# Patient Record
Sex: FEMALE | Race: WHITE | URBAN - METROPOLITAN AREA
[De-identification: names, ages, dates, MRNs, and addresses within clinical notes are randomized per-mention and may not be internally consistent; named-entity substitution may affect disease eponyms.]

---

## 2017-03-15 ENCOUNTER — OUTPATIENT (OUTPATIENT)
Dept: OUTPATIENT SERVICES | Facility: HOSPITAL | Age: 27
LOS: 1 days | End: 2017-03-15

## 2017-03-24 ENCOUNTER — OUTPATIENT (OUTPATIENT)
Dept: OUTPATIENT SERVICES | Facility: HOSPITAL | Age: 27
LOS: 1 days | End: 2017-03-24

## 2017-04-05 ENCOUNTER — OUTPATIENT (OUTPATIENT)
Dept: OUTPATIENT SERVICES | Facility: HOSPITAL | Age: 27
LOS: 1 days | End: 2017-04-05

## 2017-05-30 ENCOUNTER — TRANSCRIPTION ENCOUNTER (OUTPATIENT)
Age: 27
End: 2017-05-30

## 2017-06-11 ENCOUNTER — TRANSCRIPTION ENCOUNTER (OUTPATIENT)
Age: 27
End: 2017-06-11

## 2017-12-26 ENCOUNTER — TRANSCRIPTION ENCOUNTER (OUTPATIENT)
Age: 27
End: 2017-12-26

## 2018-07-01 ENCOUNTER — TRANSCRIPTION ENCOUNTER (OUTPATIENT)
Age: 28
End: 2018-07-01

## 2019-02-05 ENCOUNTER — TRANSCRIPTION ENCOUNTER (OUTPATIENT)
Age: 29
End: 2019-02-05

## 2019-06-17 ENCOUNTER — OUTPATIENT (OUTPATIENT)
Dept: OUTPATIENT SERVICES | Facility: HOSPITAL | Age: 29
LOS: 1 days | End: 2019-06-17

## 2019-06-28 ENCOUNTER — OUTPATIENT (OUTPATIENT)
Dept: OUTPATIENT SERVICES | Facility: HOSPITAL | Age: 29
LOS: 1 days | End: 2019-06-28

## 2019-10-07 DIAGNOSIS — Z87.39 PERSONAL HISTORY OF OTHER DISEASES OF THE MUSCULOSKELETAL SYSTEM AND CONNECTIVE TISSUE: ICD-10-CM

## 2019-10-07 DIAGNOSIS — Z83.3 FAMILY HISTORY OF DIABETES MELLITUS: ICD-10-CM

## 2019-10-11 ENCOUNTER — APPOINTMENT (OUTPATIENT)
Dept: CARDIOLOGY | Facility: CLINIC | Age: 29
End: 2019-10-11
Payer: COMMERCIAL

## 2019-10-11 ENCOUNTER — NON-APPOINTMENT (OUTPATIENT)
Age: 29
End: 2019-10-11

## 2019-10-11 VITALS
DIASTOLIC BLOOD PRESSURE: 72 MMHG | HEIGHT: 67 IN | WEIGHT: 196 LBS | BODY MASS INDEX: 30.76 KG/M2 | OXYGEN SATURATION: 99 % | HEART RATE: 82 BPM | SYSTOLIC BLOOD PRESSURE: 130 MMHG

## 2019-10-11 PROCEDURE — 93000 ELECTROCARDIOGRAM COMPLETE: CPT

## 2019-10-11 PROCEDURE — 99203 OFFICE O/P NEW LOW 30 MIN: CPT

## 2019-10-11 RX ORDER — MULTIVITAMIN
TABLET ORAL DAILY
Refills: 0 | Status: ACTIVE | COMMUNITY

## 2019-10-11 NOTE — PHYSICAL EXAM
[Normal Oral Mucosa] : normal oral mucosa [No Oral Cyanosis] : no oral cyanosis [No Oral Pallor] : no oral pallor [Normal Jugular Venous A Waves Present] : normal jugular venous A waves present [No Jugular Venous Ulrich A Waves] : no jugular venous ulrich A waves [Heart Rate And Rhythm] : heart rate and rhythm were normal [Normal Jugular Venous V Waves Present] : normal jugular venous V waves present [Heart Sounds] : normal S1 and S2 [Murmurs] : no murmurs present [Exaggerated Use Of Accessory Muscles For Inspiration] : no accessory muscle use [Respiration, Rhythm And Depth] : normal respiratory rhythm and effort [Auscultation Breath Sounds / Voice Sounds] : lungs were clear to auscultation bilaterally [Abdomen Soft] : soft [Abdomen Tenderness] : non-tender [Abdomen Mass (___ Cm)] : no abdominal mass palpated [Abnormal Walk] : normal gait [Nail Clubbing] : no clubbing of the fingernails [Gait - Sufficient For Exercise Testing] : the gait was sufficient for exercise testing [Cyanosis, Localized] : no localized cyanosis [Petechial Hemorrhages (___cm)] : no petechial hemorrhages [Skin Color & Pigmentation] : normal skin color and pigmentation [] : no rash [No Venous Stasis] : no venous stasis [No Skin Ulcers] : no skin ulcer [Skin Lesions] : no skin lesions [No Xanthoma] : no  xanthoma was observed [Oriented To Time, Place, And Person] : oriented to person, place, and time [Affect] : the affect was normal [Mood] : the mood was normal [No Anxiety] : not feeling anxious

## 2019-10-12 NOTE — HISTORY OF PRESENT ILLNESS
[FreeTextEntry1] : MARTINE CRUZ  is a 29 year old  F\par with history of psoriasis, chronic idiopathic urticaria, and familial Mediterranean fever\par Referred by her rheumatologist. \par She was diagnosed with FMF approximately 5 years ago. \par At that time was having abdominal pain and fevers. \par She reports having symptoms like this intermittently for years. \par Her father also has FMF and had pericarditis. \par \par She is active, walks, yoga, barre without limitations \par There are symptoms of dizziness. \par Her baseline blood pressure is low \par There is no exertional chest pain, pressure or discomfort. \par There is no significant dyspnea on exertion or orthopnea. \par There are no symptomatic palpitations, or syncope.\par \par There is no prior history of a clinical myocardial infarction, coronary revascularization. \par There is no history of symptomatic congestive heart failure rheumatic heart disease or valvular disease.\par There is no history of symptomatic arrhythmias including atrial fibrillation.

## 2019-10-12 NOTE — ASSESSMENT
[FreeTextEntry1] : FMF \par Echocardiogram to evaluate for pericardial disease rule out cardiomyopathy. \par Will call with results \par Requested outside rheumatology consultation \par

## 2019-10-25 ENCOUNTER — APPOINTMENT (OUTPATIENT)
Dept: CARDIOLOGY | Facility: CLINIC | Age: 29
End: 2019-10-25

## 2019-11-05 ENCOUNTER — APPOINTMENT (OUTPATIENT)
Dept: CARDIOLOGY | Facility: CLINIC | Age: 29
End: 2019-11-05

## 2019-11-13 ENCOUNTER — OUTPATIENT (OUTPATIENT)
Dept: OUTPATIENT SERVICES | Facility: HOSPITAL | Age: 29
LOS: 1 days | End: 2019-11-13

## 2019-11-18 ENCOUNTER — TRANSCRIPTION ENCOUNTER (OUTPATIENT)
Age: 29
End: 2019-11-18

## 2019-11-22 ENCOUNTER — OUTPATIENT (OUTPATIENT)
Dept: OUTPATIENT SERVICES | Facility: HOSPITAL | Age: 29
LOS: 1 days | End: 2019-11-22

## 2020-04-02 ENCOUNTER — TRANSCRIPTION ENCOUNTER (OUTPATIENT)
Age: 30
End: 2020-04-02

## 2020-04-26 ENCOUNTER — MESSAGE (OUTPATIENT)
Age: 30
End: 2020-04-26

## 2020-05-05 ENCOUNTER — APPOINTMENT (OUTPATIENT)
Dept: DISASTER EMERGENCY | Facility: HOSPITAL | Age: 30
End: 2020-05-05

## 2020-05-06 LAB
SARS-COV-2 IGG SERPL IA-ACNC: 4.5 INDEX
SARS-COV-2 IGG SERPL QL IA: POSITIVE

## 2020-05-26 ENCOUNTER — RX CHANGE (OUTPATIENT)
Age: 30
End: 2020-05-26

## 2020-05-26 RX ORDER — ALBUTEROL SULFATE 90 UG/1
108 (90 BASE) INHALANT RESPIRATORY (INHALATION)
Qty: 2 | Refills: 2 | Status: DISCONTINUED | COMMUNITY
Start: 2020-04-20 | End: 2020-05-26

## 2020-05-28 ENCOUNTER — RX CHANGE (OUTPATIENT)
Age: 30
End: 2020-05-28

## 2020-07-16 ENCOUNTER — EMERGENCY (EMERGENCY)
Facility: HOSPITAL | Age: 30
LOS: 1 days | End: 2020-07-16
Admitting: EMERGENCY MEDICINE
Payer: COMMERCIAL

## 2020-07-16 PROCEDURE — 99284 EMERGENCY DEPT VISIT MOD MDM: CPT

## 2020-10-22 ENCOUNTER — TRANSCRIPTION ENCOUNTER (OUTPATIENT)
Age: 30
End: 2020-10-22

## 2020-10-29 ENCOUNTER — TRANSCRIPTION ENCOUNTER (OUTPATIENT)
Age: 30
End: 2020-10-29

## 2020-11-16 ENCOUNTER — APPOINTMENT (OUTPATIENT)
Dept: OBGYN | Facility: CLINIC | Age: 30
End: 2020-11-16
Payer: COMMERCIAL

## 2020-11-16 ENCOUNTER — TRANSCRIPTION ENCOUNTER (OUTPATIENT)
Age: 30
End: 2020-11-16

## 2020-11-16 VITALS
HEIGHT: 67 IN | BODY MASS INDEX: 31.71 KG/M2 | SYSTOLIC BLOOD PRESSURE: 104 MMHG | DIASTOLIC BLOOD PRESSURE: 70 MMHG | WEIGHT: 202 LBS

## 2020-11-16 DIAGNOSIS — U07.1 COVID-19: ICD-10-CM

## 2020-11-16 DIAGNOSIS — Z80.8 FAMILY HISTORY OF MALIGNANT NEOPLASM OF OTHER ORGANS OR SYSTEMS: ICD-10-CM

## 2020-11-16 DIAGNOSIS — Z80.9 FAMILY HISTORY OF MALIGNANT NEOPLASM, UNSPECIFIED: ICD-10-CM

## 2020-11-16 DIAGNOSIS — Z82.61 FAMILY HISTORY OF ARTHRITIS: ICD-10-CM

## 2020-11-16 DIAGNOSIS — N84.0 POLYP OF CORPUS UTERI: ICD-10-CM

## 2020-11-16 PROCEDURE — 99395 PREV VISIT EST AGE 18-39: CPT

## 2020-11-16 PROCEDURE — 99213 OFFICE O/P EST LOW 20 MIN: CPT | Mod: 25

## 2020-11-16 NOTE — HISTORY OF PRESENT ILLNESS
[FreeTextEntry1] : 40yo G0 LMP 10/31 using natural familiy planning for BCM. Her cycles range from 28-40 days. Her previous GYN tested her for PCOS and she sees and endocrinologist for elevated testosterone. Pt has not had any recent travel, known sick contacts or contact with any PUI, of coronavirus sxs.\par

## 2020-11-16 NOTE — DISCUSSION/SUMMARY
[FreeTextEntry1] : 31yo G0 LMP 10/31 here for annual.  She declined BCM at this time therefore instructed to take NV with FA daily \par \par RHM: \par - DVS neg x 3\par - Dentist, PCP, Derm as discussed \par - Offered STI screen today. Pt declined -- GC/CT off pap \par - Encouraged healthy diet, exercise, weight loss \par \par Essence Block MD \par Obstetrician/Gynecologist\par \par

## 2020-11-16 NOTE — COUNSELING
[Nutrition/ Exercise/ Weight Management] : nutrition, exercise, weight management [Vitamins/Supplements] : vitamins/supplements [Sunscreen] : sunscreen [Drugs/Alcohol] : drugs, alcohol [Contraception/ Emergency Contraception/ Safe Sexual Practices] : contraception, emergency contraception, safe sexual practices [STD (testing, results, tx)] : STD (testing, results, tx)

## 2020-11-21 LAB
C TRACH RRNA SPEC QL NAA+PROBE: NOT DETECTED
CYTOLOGY CVX/VAG DOC THIN PREP: NORMAL
HPV HIGH+LOW RISK DNA PNL CVX: NOT DETECTED
N GONORRHOEA RRNA SPEC QL NAA+PROBE: NOT DETECTED
SOURCE TP AMPLIFICATION: NORMAL

## 2020-12-03 ENCOUNTER — TRANSCRIPTION ENCOUNTER (OUTPATIENT)
Age: 30
End: 2020-12-03

## 2020-12-03 ENCOUNTER — OUTPATIENT (OUTPATIENT)
Dept: OUTPATIENT SERVICES | Facility: HOSPITAL | Age: 30
LOS: 1 days | End: 2020-12-03

## 2020-12-03 ENCOUNTER — NON-APPOINTMENT (OUTPATIENT)
Age: 30
End: 2020-12-03

## 2020-12-09 ENCOUNTER — APPOINTMENT (OUTPATIENT)
Dept: ULTRASOUND IMAGING | Facility: CLINIC | Age: 30
End: 2020-12-09
Payer: COMMERCIAL

## 2020-12-09 ENCOUNTER — APPOINTMENT (OUTPATIENT)
Dept: OBGYN | Facility: CLINIC | Age: 30
End: 2020-12-09
Payer: COMMERCIAL

## 2020-12-09 VITALS
HEIGHT: 67 IN | WEIGHT: 207 LBS | DIASTOLIC BLOOD PRESSURE: 70 MMHG | BODY MASS INDEX: 32.49 KG/M2 | SYSTOLIC BLOOD PRESSURE: 108 MMHG

## 2020-12-09 VITALS — TEMPERATURE: 96.3 F

## 2020-12-09 PROCEDURE — 99072 ADDL SUPL MATRL&STAF TM PHE: CPT

## 2020-12-09 PROCEDURE — 76817 TRANSVAGINAL US OBSTETRIC: CPT

## 2020-12-09 PROCEDURE — 99213 OFFICE O/P EST LOW 20 MIN: CPT

## 2020-12-11 ENCOUNTER — TRANSCRIPTION ENCOUNTER (OUTPATIENT)
Age: 30
End: 2020-12-11

## 2021-01-12 ENCOUNTER — APPOINTMENT (OUTPATIENT)
Dept: FAMILY MEDICINE | Facility: CLINIC | Age: 31
End: 2021-01-12
Payer: COMMERCIAL

## 2021-01-12 VITALS
HEIGHT: 67 IN | SYSTOLIC BLOOD PRESSURE: 100 MMHG | RESPIRATION RATE: 14 BRPM | WEIGHT: 207 LBS | HEART RATE: 72 BPM | BODY MASS INDEX: 32.49 KG/M2 | OXYGEN SATURATION: 100 % | TEMPERATURE: 97.8 F | DIASTOLIC BLOOD PRESSURE: 60 MMHG

## 2021-01-12 DIAGNOSIS — Z78.9 OTHER SPECIFIED HEALTH STATUS: ICD-10-CM

## 2021-01-12 DIAGNOSIS — Z87.898 PERSONAL HISTORY OF OTHER SPECIFIED CONDITIONS: ICD-10-CM

## 2021-01-12 PROCEDURE — 90471 IMMUNIZATION ADMIN: CPT

## 2021-01-12 PROCEDURE — 36415 COLL VENOUS BLD VENIPUNCTURE: CPT

## 2021-01-12 PROCEDURE — 99385 PREV VISIT NEW AGE 18-39: CPT | Mod: 25

## 2021-01-12 PROCEDURE — 90686 IIV4 VACC NO PRSV 0.5 ML IM: CPT

## 2021-01-12 PROCEDURE — 99072 ADDL SUPL MATRL&STAF TM PHE: CPT

## 2021-01-12 RX ORDER — LEVOCETIRIZINE DIHYDROCHLORIDE 5 MG/1
5 TABLET, FILM COATED ORAL DAILY
Refills: 0 | Status: DISCONTINUED | COMMUNITY
End: 2021-01-12

## 2021-01-12 RX ORDER — ERGOCALCIFEROL 1.25 MG/1
1.25 MG CAPSULE, LIQUID FILLED ORAL
Qty: 4 | Refills: 0 | Status: DISCONTINUED | COMMUNITY
Start: 2020-09-16 | End: 2021-01-12

## 2021-01-12 RX ORDER — PEN NEEDLE, DIABETIC 32 GX 1/4"
32G X 6 MM NEEDLE, DISPOSABLE MISCELLANEOUS
Qty: 100 | Refills: 0 | Status: DISCONTINUED | COMMUNITY
Start: 2020-10-28 | End: 2021-01-12

## 2021-01-12 RX ORDER — METRONIDAZOLE 500 MG/1
500 TABLET ORAL
Qty: 14 | Refills: 0 | Status: COMPLETED | COMMUNITY
Start: 2020-10-29

## 2021-01-12 RX ORDER — LIRAGLUTIDE 6 MG/ML
18 INJECTION, SOLUTION SUBCUTANEOUS
Qty: 15 | Refills: 0 | Status: DISCONTINUED | COMMUNITY
Start: 2020-10-15 | End: 2021-01-12

## 2021-01-12 RX ORDER — FLUCONAZOLE 150 MG/1
150 TABLET ORAL
Qty: 2 | Refills: 0 | Status: COMPLETED | COMMUNITY
Start: 2020-10-22

## 2021-01-12 RX ORDER — CHOLECALCIFEROL (VITAMIN D3) 50 MCG
2000 CAPSULE ORAL DAILY
Refills: 0 | Status: DISCONTINUED | COMMUNITY
End: 2021-01-12

## 2021-01-12 RX ORDER — TERCONAZOLE 80 MG/1
80 SUPPOSITORY VAGINAL
Qty: 3 | Refills: 0 | Status: COMPLETED | COMMUNITY
Start: 2020-10-22

## 2021-01-12 RX ORDER — APREMILAST 30 MG/1
30 TABLET, FILM COATED ORAL
Refills: 0 | Status: DISCONTINUED | COMMUNITY
End: 2021-01-12

## 2021-01-12 NOTE — ASSESSMENT
[FreeTextEntry1] : New Patient  exam for 30   year old WF with PMH as stated in HPI / active list. \par \par Management : \par \par See HPI and Plan\par \par Labs in office today.   Will advise. \par \par Best wishes offered !\par

## 2021-01-12 NOTE — PHYSICAL EXAM
[No Acute Distress] : no acute distress [Normal Sclera/Conjunctiva] : normal sclera/conjunctiva [PERRL] : pupils equal round and reactive to light [Normal Oropharynx] : the oropharynx was normal [Normal TMs] : both tympanic membranes were normal [No Lymphadenopathy] : no lymphadenopathy [Thyroid Normal, No Nodules] : the thyroid was normal and there were no nodules present [No Respiratory Distress] : no respiratory distress  [No Accessory Muscle Use] : no accessory muscle use [Clear to Auscultation] : lungs were clear to auscultation bilaterally [Normal Rate] : normal rate  [Regular Rhythm] : with a regular rhythm [No Edema] : there was no peripheral edema [Soft] : abdomen soft [Non Tender] : non-tender [No Masses] : no abdominal mass palpated [Normal Supraclavicular Nodes] : no supraclavicular lymphadenopathy [Normal Posterior Cervical Nodes] : no posterior cervical lymphadenopathy [Normal Anterior Cervical Nodes] : no anterior cervical lymphadenopathy [No Spinal Tenderness] : no spinal tenderness [No Joint Swelling] : no joint swelling [Grossly Normal Strength/Tone] : grossly normal strength/tone [No Rash] : no rash [No Focal Deficits] : no focal deficits [Normal Gait] : normal gait [Deep Tendon Reflexes (DTR)] : deep tendon reflexes were 2+ and symmetric [Alert and Oriented x3] : oriented to person, place, and time [Normal Insight/Judgement] : insight and judgment were intact [de-identified] : calm and engaging  [de-identified] : MASK

## 2021-01-12 NOTE — REVIEW OF SYSTEMS
[Fever] : no fever [Fatigue] : fatigue [Night Sweats] : no night sweats [Nasal Discharge] : nasal discharge [Postnasal Drip] : postnasal drip [Itching] : Itching [Negative] : Psychiatric [de-identified] : scalp

## 2021-01-12 NOTE — HISTORY OF PRESENT ILLNESS
[FreeTextEntry1] : new pt here to establish care and annual physical  [de-identified] : Ms. MARTINE CRUZ presents today to establish care being referred to me by staff at [facility]\par She is an affable 30 year old female with PMH significant for Familial Mediterranean  Fever , PCOS, Psoriasis and psoriatic arthritis  ( treated with Otezla in the past) .\par She was COVID + in April;   fully recovered. \par \par IS now 10 weeks pregnant; Due Date is August 7 2020. \par \par PSH significant for as noted \par \par Denies any recent ER visits/hospitalizations/ MVA's or MSK injuries. \par \par Providers:   Rheumatology Dr. Kumari \par                    Endocrinology  Slim Mcfarlane; Dr. Lila Guzman Higinio\par                    OB/GYN  Dr. 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

## 2021-01-15 LAB
ALBUMIN SERPL ELPH-MCNC: 4.6 G/DL
ALP BLD-CCNC: 67 U/L
ALT SERPL-CCNC: 10 U/L
ANION GAP SERPL CALC-SCNC: 16 MMOL/L
AST SERPL-CCNC: 14 U/L
BASOPHILS # BLD AUTO: 0.02 K/UL
BASOPHILS NFR BLD AUTO: 0.1 %
BILIRUB SERPL-MCNC: 0.2 MG/DL
BUN SERPL-MCNC: 8 MG/DL
CALCIUM SERPL-MCNC: 9.8 MG/DL
CHLORIDE SERPL-SCNC: 100 MMOL/L
CHOLEST SERPL-MCNC: 165 MG/DL
CO2 SERPL-SCNC: 21 MMOL/L
CREAT SERPL-MCNC: 0.88 MG/DL
EOSINOPHIL # BLD AUTO: 0.03 K/UL
EOSINOPHIL NFR BLD AUTO: 0.2 %
ESTIMATED AVERAGE GLUCOSE: 97 MG/DL
GLUCOSE SERPL-MCNC: 85 MG/DL
HBA1C MFR BLD HPLC: 5 %
HCT VFR BLD CALC: 41.2 %
HDLC SERPL-MCNC: 67 MG/DL
HGB BLD-MCNC: 12.8 G/DL
IMM GRANULOCYTES NFR BLD AUTO: 0.1 %
LDLC SERPL CALC-MCNC: 85 MG/DL
LYMPHOCYTES # BLD AUTO: 2.85 K/UL
LYMPHOCYTES NFR BLD AUTO: 18.9 %
MAN DIFF?: NORMAL
MCHC RBC-ENTMCNC: 30.4 PG
MCHC RBC-ENTMCNC: 31.1 GM/DL
MCV RBC AUTO: 97.9 FL
MONOCYTES # BLD AUTO: 0.87 K/UL
MONOCYTES NFR BLD AUTO: 5.8 %
NEUTROPHILS # BLD AUTO: 11.26 K/UL
NEUTROPHILS NFR BLD AUTO: 74.9 %
NONHDLC SERPL-MCNC: 98 MG/DL
PLATELET # BLD AUTO: 352 K/UL
POTASSIUM SERPL-SCNC: 4.2 MMOL/L
PROT SERPL-MCNC: 7.5 G/DL
RBC # BLD: 4.21 M/UL
RBC # FLD: 13.2 %
SODIUM SERPL-SCNC: 138 MMOL/L
TRIGL SERPL-MCNC: 67 MG/DL
TSH SERPL-ACNC: 1.3 UIU/ML
WBC # FLD AUTO: 15.05 K/UL

## 2021-01-22 ENCOUNTER — ASOB RESULT (OUTPATIENT)
Age: 31
End: 2021-01-22

## 2021-01-22 ENCOUNTER — APPOINTMENT (OUTPATIENT)
Dept: OBGYN | Facility: CLINIC | Age: 31
End: 2021-01-22
Payer: COMMERCIAL

## 2021-01-22 ENCOUNTER — NON-APPOINTMENT (OUTPATIENT)
Age: 31
End: 2021-01-22

## 2021-01-22 VITALS
HEIGHT: 67 IN | WEIGHT: 209 LBS | SYSTOLIC BLOOD PRESSURE: 116 MMHG | BODY MASS INDEX: 32.8 KG/M2 | DIASTOLIC BLOOD PRESSURE: 82 MMHG

## 2021-01-22 PROCEDURE — 99072 ADDL SUPL MATRL&STAF TM PHE: CPT

## 2021-01-22 PROCEDURE — 76813 OB US NUCHAL MEAS 1 GEST: CPT | Mod: 59

## 2021-01-22 PROCEDURE — 0500F INITIAL PRENATAL CARE VISIT: CPT

## 2021-01-25 ENCOUNTER — NON-APPOINTMENT (OUTPATIENT)
Age: 31
End: 2021-01-25

## 2021-01-26 ENCOUNTER — NON-APPOINTMENT (OUTPATIENT)
Age: 31
End: 2021-01-26

## 2021-01-27 LAB
ABO + RH PNL BLD: NORMAL
ADDENDUM DOC: NORMAL
AFP PNL SERPL: NORMAL
AFP SERPL-ACNC: NORMAL
AR GENE MUT ANL BLD/T: NORMAL
B19V IGG SER QL IA: 6.79 INDEX
B19V IGG+IGM SER-IMP: NORMAL
B19V IGG+IGM SER-IMP: POSITIVE
B19V IGM FLD-ACNC: 0.14 INDEX
B19V IGM SER-ACNC: NEGATIVE
BACTERIA UR CULT: NORMAL
BASOPHILS # BLD AUTO: 0.01 K/UL
BASOPHILS NFR BLD AUTO: 0.1 %
BLD GP AB SCN SERPL QL: NORMAL
BP MEAN: NORMAL
CLINICAL BIOCHEMIST REVIEW: NORMAL
CMV IGG SERPL QL: 8.8 U/ML
CMV IGG SERPL-IMP: POSITIVE
CMV IGM SERPL QL: <8 AU/ML
CMV IGM SERPL QL: NEGATIVE
EARLY ONSET PREECLAMPSIA: NORMAL
EOSINOPHIL # BLD AUTO: 0.04 K/UL
EOSINOPHIL NFR BLD AUTO: 0.3 %
FREE BETA HCG 1ST TRIMESTER: NORMAL
HBV SURFACE AG SER QL: NONREACTIVE
HCT VFR BLD CALC: 39.7 %
HCV AB SER QL: NONREACTIVE
HCV S/CO RATIO: 0.16 S/CO
HGB A MFR BLD: 96.4 %
HGB A2 MFR BLD: 2.8 %
HGB BLD-MCNC: 12.8 G/DL
HGB F MFR BLD: 0.8 %
HGB FRACT BLD-IMP: NORMAL
HIV1+2 AB SPEC QL IA.RAPID: NONREACTIVE
IMM GRANULOCYTES NFR BLD AUTO: 0.4 %
INHIBIN-A 1ST TRIMESTER: NORMAL
LEAD BLD-MCNC: <1 UG/DL
LYMPHOCYTES # BLD AUTO: 3.05 K/UL
LYMPHOCYTES NFR BLD AUTO: 24.7 %
Lab: NORMAL
M TB IFN-G BLD-IMP: NEGATIVE
MAN DIFF?: NORMAL
MCHC RBC-ENTMCNC: 30.8 PG
MCHC RBC-ENTMCNC: 32.2 GM/DL
MCV RBC AUTO: 95.4 FL
MEV IGG FLD QL IA: >300 AU/ML
MEV IGG+IGM SER-IMP: POSITIVE
MEV IGM SER QL: <0.91 ISR
MONOCYTES # BLD AUTO: 0.72 K/UL
MONOCYTES NFR BLD AUTO: 5.8 %
NASAL BONE: PRESENT
NEUTROPHILS # BLD AUTO: 8.46 K/UL
NEUTROPHILS NFR BLD AUTO: 68.7 %
NOTES NTD: NORMAL
NT: NORMAL
PAPP-A SERPL-ACNC: NORMAL
PIGF SER-MCNC: NORMAL
PLATELET # BLD AUTO: 327 K/UL
QUANTIFERON TB PLUS MITOGEN MINUS NIL: 8.78 IU/ML
QUANTIFERON TB PLUS NIL: 0.02 IU/ML
QUANTIFERON TB PLUS TB1 MINUS NIL: -0.01 IU/ML
QUANTIFERON TB PLUS TB2 MINUS NIL: -0.01 IU/ML
RBC # BLD: 4.16 M/UL
RBC # FLD: 12.9 %
RUBV IGG FLD-ACNC: 14.2 INDEX
RUBV IGG SER-IMP: POSITIVE
RUBV IGM FLD-ACNC: <20 AU/ML
T GONDII AB SER-IMP: NEGATIVE
T GONDII AB SER-IMP: NEGATIVE
T GONDII IGG SER QL: <3 IU/ML
T GONDII IGM SER QL: <3 AU/ML
T PALLIDUM AB SER QL IA: NEGATIVE
TRISOMY 18/3: NORMAL
UTERINE ARTERY DOPPLER PI (UTAD-PI): NORMAL
VZV AB TITR SER: POSITIVE
VZV IGG SER IF-ACNC: >4000 INDEX
WBC # FLD AUTO: 12.33 K/UL

## 2021-02-02 ENCOUNTER — APPOINTMENT (OUTPATIENT)
Dept: OBGYN | Facility: CLINIC | Age: 31
End: 2021-02-02

## 2021-02-03 ENCOUNTER — APPOINTMENT (OUTPATIENT)
Dept: OBGYN | Facility: CLINIC | Age: 31
End: 2021-02-03
Payer: COMMERCIAL

## 2021-02-03 PROCEDURE — 0500F INITIAL PRENATAL CARE VISIT: CPT

## 2021-02-14 ENCOUNTER — NON-APPOINTMENT (OUTPATIENT)
Age: 31
End: 2021-02-14

## 2021-02-16 ENCOUNTER — NON-APPOINTMENT (OUTPATIENT)
Age: 31
End: 2021-02-16

## 2021-02-16 ENCOUNTER — TRANSCRIPTION ENCOUNTER (OUTPATIENT)
Age: 31
End: 2021-02-16

## 2021-02-17 ENCOUNTER — OUTPATIENT (OUTPATIENT)
Dept: OUTPATIENT SERVICES | Facility: HOSPITAL | Age: 31
LOS: 1 days | End: 2021-02-17

## 2021-02-19 LAB
CFTR MUT TESTED BLD/T: NEGATIVE
CLARIM 15Q11.2: NORMAL
CLARIM 1P36: NORMAL
CLARIM 22Q11.2: NORMAL
CLARIM 4P-/WOLF-HIRSCHHORN: NORMAL
CLARIM 5P-/CRI DU CHAT: NORMAL
CLARIM ADDITIONAL INFO: NORMAL
CLARIM CHROMOSOME 13: NORMAL
CLARIM CHROMOSOME 18: NORMAL
CLARIM CHROMOSOME 21: NORMAL
CLARIM SEX CHROMOSOMES: NORMAL
CLARITEST NIPT W/MICRO: NORMAL
FMR1 GENE MUT ANL BLD/T: NORMAL

## 2021-02-21 ENCOUNTER — NON-APPOINTMENT (OUTPATIENT)
Age: 31
End: 2021-02-21

## 2021-02-21 NOTE — HISTORY OF PRESENT ILLNESS
[FreeTextEntry1] : 29yo G1 LMP 10/31 with Pos HPT. She reports LLQ pain. No bleeding or d/c. Pt has not had any recent travel, known sick contacts or contact with any PUI, of coronavirus sxs.\par \par \par Her recent pap/HPV/GC/CT. \par \par

## 2021-02-21 NOTE — DISCUSSION/SUMMARY
[FreeTextEntry1] : 31yo G1 @ with pos UCG and LLQ pain. Sent for stat TVUS to r/o ectopic. \par \par - stop RA meds \par - TVUS ASAP\par - PNV with FA\par - Dietary, exercise, travel, intercourse \par - RTO @ 8wks for dating sono and official intake

## 2021-02-21 NOTE — PHYSICAL EXAM
[Soft] : soft [No Lesions] : no lesions [No Mass] : no mass [FreeTextEntry7] : minimally tender to palpation in LLQ  [Labia Majora] : normal [Labia Minora] : normal [Normal] : normal [Adnexa Tenderness On The Left] : tender [FreeTextEntry6] : minimal tenderness to deep palpation.  No R/G/R

## 2021-02-22 ENCOUNTER — NON-APPOINTMENT (OUTPATIENT)
Age: 31
End: 2021-02-22

## 2021-02-22 ENCOUNTER — APPOINTMENT (OUTPATIENT)
Dept: OBGYN | Facility: CLINIC | Age: 31
End: 2021-02-22

## 2021-02-22 ENCOUNTER — APPOINTMENT (OUTPATIENT)
Dept: OBGYN | Facility: CLINIC | Age: 31
End: 2021-02-22
Payer: COMMERCIAL

## 2021-02-22 VITALS
WEIGHT: 214 LBS | SYSTOLIC BLOOD PRESSURE: 112 MMHG | BODY MASS INDEX: 33.59 KG/M2 | HEIGHT: 67 IN | DIASTOLIC BLOOD PRESSURE: 70 MMHG

## 2021-02-22 LAB
BILIRUB UR QL STRIP: NORMAL
GLUCOSE UR-MCNC: NORMAL
HCG UR QL: 0.2 EU/DL
HGB UR QL STRIP.AUTO: NORMAL
KETONES UR-MCNC: NORMAL
LEUKOCYTE ESTERASE UR QL STRIP: NORMAL
NITRITE UR QL STRIP: NORMAL
PH UR STRIP: 6
PROT UR STRIP-MCNC: NORMAL
SP GR UR STRIP: 1.01

## 2021-02-22 PROCEDURE — 0502F SUBSEQUENT PRENATAL CARE: CPT

## 2021-03-02 ENCOUNTER — ASOB RESULT (OUTPATIENT)
Age: 31
End: 2021-03-02

## 2021-03-02 ENCOUNTER — APPOINTMENT (OUTPATIENT)
Dept: MATERNAL FETAL MEDICINE | Facility: CLINIC | Age: 31
End: 2021-03-02
Payer: COMMERCIAL

## 2021-03-02 PROCEDURE — 99202 OFFICE O/P NEW SF 15 MIN: CPT | Mod: 95

## 2021-03-21 ENCOUNTER — NON-APPOINTMENT (OUTPATIENT)
Age: 31
End: 2021-03-21

## 2021-03-21 LAB
1ST TRIMESTER DATA: NORMAL
2ND TRIMESTER DATA: NORMAL
ADDENDUM DOC: NORMAL
AFP PNL SERPL: NORMAL
AFP SERPL-ACNC: NORMAL
AFP SERPL-ACNC: NORMAL
B-HCG FREE SERPL-MCNC: NORMAL
CLINICAL BIOCHEMIST REVIEW: NORMAL
FREE BETA HCG 1ST TRIMESTER: NORMAL
INHIBIN A SERPL-MCNC: NORMAL
INHIBIN-A 1ST TRIMESTER: NORMAL
NASAL BONE: PRESENT
NOTES NTD: NORMAL
NT: NORMAL
PAPP-A SERPL-ACNC: NORMAL
PIGF SER-MCNC: NORMAL
U ESTRIOL SERPL-SCNC: NORMAL

## 2021-03-22 ENCOUNTER — APPOINTMENT (OUTPATIENT)
Dept: OBGYN | Facility: CLINIC | Age: 31
End: 2021-03-22

## 2021-03-24 ENCOUNTER — NON-APPOINTMENT (OUTPATIENT)
Age: 31
End: 2021-03-24

## 2021-03-24 ENCOUNTER — APPOINTMENT (OUTPATIENT)
Dept: ANTEPARTUM | Facility: CLINIC | Age: 31
End: 2021-03-24
Payer: COMMERCIAL

## 2021-03-24 ENCOUNTER — ASOB RESULT (OUTPATIENT)
Age: 31
End: 2021-03-24

## 2021-03-24 PROCEDURE — 76811 OB US DETAILED SNGL FETUS: CPT | Mod: 59

## 2021-03-24 PROCEDURE — 99072 ADDL SUPL MATRL&STAF TM PHE: CPT

## 2021-03-25 ENCOUNTER — NON-APPOINTMENT (OUTPATIENT)
Age: 31
End: 2021-03-25

## 2021-03-25 ENCOUNTER — APPOINTMENT (OUTPATIENT)
Dept: OBGYN | Facility: CLINIC | Age: 31
End: 2021-03-25
Payer: COMMERCIAL

## 2021-03-25 VITALS
HEIGHT: 67 IN | DIASTOLIC BLOOD PRESSURE: 76 MMHG | BODY MASS INDEX: 34.37 KG/M2 | WEIGHT: 219 LBS | SYSTOLIC BLOOD PRESSURE: 120 MMHG

## 2021-03-25 LAB
BILIRUB UR QL STRIP: NORMAL
COLLECTION METHOD: NORMAL
GLUCOSE UR-MCNC: NORMAL
HCG UR QL: 0.2 EU/DL
HGB UR QL STRIP.AUTO: ABNORMAL
KETONES UR-MCNC: NORMAL
LEUKOCYTE ESTERASE UR QL STRIP: NORMAL
NITRITE UR QL STRIP: NORMAL
PH UR STRIP: 7.5
PROT UR STRIP-MCNC: NORMAL
SP GR UR STRIP: 1.02

## 2021-03-25 PROCEDURE — 0502F SUBSEQUENT PRENATAL CARE: CPT

## 2021-04-05 ENCOUNTER — NON-APPOINTMENT (OUTPATIENT)
Age: 31
End: 2021-04-05

## 2021-04-07 ENCOUNTER — APPOINTMENT (OUTPATIENT)
Dept: ANTEPARTUM | Facility: CLINIC | Age: 31
End: 2021-04-07
Payer: COMMERCIAL

## 2021-04-07 ENCOUNTER — ASOB RESULT (OUTPATIENT)
Age: 31
End: 2021-04-07

## 2021-04-07 PROCEDURE — 99072 ADDL SUPL MATRL&STAF TM PHE: CPT

## 2021-04-07 PROCEDURE — 76816 OB US FOLLOW-UP PER FETUS: CPT

## 2021-04-12 ENCOUNTER — NON-APPOINTMENT (OUTPATIENT)
Age: 31
End: 2021-04-12

## 2021-04-19 ENCOUNTER — NON-APPOINTMENT (OUTPATIENT)
Age: 31
End: 2021-04-19

## 2021-04-19 ENCOUNTER — APPOINTMENT (OUTPATIENT)
Dept: OBGYN | Facility: CLINIC | Age: 31
End: 2021-04-19
Payer: COMMERCIAL

## 2021-04-19 ENCOUNTER — APPOINTMENT (OUTPATIENT)
Dept: OBGYN | Facility: CLINIC | Age: 31
End: 2021-04-19

## 2021-04-19 VITALS
WEIGHT: 225 LBS | TEMPERATURE: 97.7 F | DIASTOLIC BLOOD PRESSURE: 68 MMHG | BODY MASS INDEX: 35.31 KG/M2 | HEIGHT: 67 IN | SYSTOLIC BLOOD PRESSURE: 112 MMHG

## 2021-04-19 LAB
BILIRUB UR QL STRIP: NORMAL
GLUCOSE UR-MCNC: NORMAL
HCG UR QL: 0.2 EU/DL
HGB UR QL STRIP.AUTO: NORMAL
KETONES UR-MCNC: NORMAL
LEUKOCYTE ESTERASE UR QL STRIP: NORMAL
NITRITE UR QL STRIP: NORMAL
PH UR STRIP: 7.5
PROT UR STRIP-MCNC: NORMAL
SP GR UR STRIP: 1.2

## 2021-04-19 PROCEDURE — 59025 FETAL NON-STRESS TEST: CPT

## 2021-04-19 PROCEDURE — 59425 ANTEPARTUM CARE ONLY: CPT

## 2021-04-19 PROCEDURE — 99072 ADDL SUPL MATRL&STAF TM PHE: CPT

## 2021-04-19 PROCEDURE — 0502F SUBSEQUENT PRENATAL CARE: CPT

## 2021-04-20 ENCOUNTER — NON-APPOINTMENT (OUTPATIENT)
Age: 31
End: 2021-04-20

## 2021-04-20 LAB
APPEARANCE: CLEAR
BACTERIA: NEGATIVE
BILIRUBIN URINE: NEGATIVE
BLOOD URINE: NEGATIVE
COLOR: YELLOW
GLUCOSE QUALITATIVE U: NEGATIVE
HYALINE CASTS: 0 /LPF
KETONES URINE: NEGATIVE
LEUKOCYTE ESTERASE URINE: NEGATIVE
MICROSCOPIC-UA: NORMAL
NITRITE URINE: NEGATIVE
PH URINE: 7.5
PROTEIN URINE: NORMAL
RED BLOOD CELLS URINE: 1 /HPF
SPECIFIC GRAVITY URINE: 1.02
SQUAMOUS EPITHELIAL CELLS: 1 /HPF
UROBILINOGEN URINE: NORMAL
WHITE BLOOD CELLS URINE: 1 /HPF

## 2021-04-21 LAB
BACTERIA UR CULT: NORMAL
CANDIDA VAG CYTO: NOT DETECTED
G VAGINALIS+PREV SP MTYP VAG QL MICRO: NOT DETECTED
T VAGINALIS VAG QL WET PREP: NOT DETECTED

## 2021-05-06 ENCOUNTER — NON-APPOINTMENT (OUTPATIENT)
Age: 31
End: 2021-05-06

## 2021-05-10 ENCOUNTER — NON-APPOINTMENT (OUTPATIENT)
Age: 31
End: 2021-05-10

## 2021-05-10 ENCOUNTER — APPOINTMENT (OUTPATIENT)
Dept: OBGYN | Facility: CLINIC | Age: 31
End: 2021-05-10
Payer: COMMERCIAL

## 2021-05-10 ENCOUNTER — LABORATORY RESULT (OUTPATIENT)
Age: 31
End: 2021-05-10

## 2021-05-10 VITALS
DIASTOLIC BLOOD PRESSURE: 68 MMHG | BODY MASS INDEX: 35.94 KG/M2 | SYSTOLIC BLOOD PRESSURE: 112 MMHG | HEIGHT: 67 IN | WEIGHT: 229 LBS

## 2021-05-10 PROCEDURE — 0502F SUBSEQUENT PRENATAL CARE: CPT

## 2021-05-12 LAB
BILIRUB UR QL STRIP: NORMAL
GLUCOSE UR-MCNC: NORMAL
HCG UR QL: 0.2 EU/DL
HGB UR QL STRIP.AUTO: NORMAL
KETONES UR-MCNC: ABNORMAL
LEUKOCYTE ESTERASE UR QL STRIP: NORMAL
NITRITE UR QL STRIP: NORMAL
PH UR STRIP: 6.5
PROT UR STRIP-MCNC: NORMAL
SP GR UR STRIP: 1.01

## 2021-06-07 ENCOUNTER — APPOINTMENT (OUTPATIENT)
Dept: OBGYN | Facility: CLINIC | Age: 31
End: 2021-06-07
Payer: COMMERCIAL

## 2021-06-07 ENCOUNTER — NON-APPOINTMENT (OUTPATIENT)
Age: 31
End: 2021-06-07

## 2021-06-07 VITALS
SYSTOLIC BLOOD PRESSURE: 108 MMHG | WEIGHT: 233 LBS | DIASTOLIC BLOOD PRESSURE: 62 MMHG | BODY MASS INDEX: 36.57 KG/M2 | HEIGHT: 67 IN

## 2021-06-07 LAB
BILIRUB UR QL STRIP: NORMAL
GLUCOSE UR-MCNC: NORMAL
HCG UR QL: 0.2 EU/DL
HGB UR QL STRIP.AUTO: NORMAL
KETONES UR-MCNC: NORMAL
LEUKOCYTE ESTERASE UR QL STRIP: NORMAL
NITRITE UR QL STRIP: NORMAL
PH UR STRIP: 8.5
PROT UR STRIP-MCNC: ABNORMAL
SP GR UR STRIP: 1.01

## 2021-06-07 PROCEDURE — 90715 TDAP VACCINE 7 YRS/> IM: CPT

## 2021-06-07 PROCEDURE — 90471 IMMUNIZATION ADMIN: CPT

## 2021-06-07 PROCEDURE — 0502F SUBSEQUENT PRENATAL CARE: CPT

## 2021-06-16 ENCOUNTER — ASOB RESULT (OUTPATIENT)
Age: 31
End: 2021-06-16

## 2021-06-16 ENCOUNTER — APPOINTMENT (OUTPATIENT)
Dept: ANTEPARTUM | Facility: CLINIC | Age: 31
End: 2021-06-16
Payer: COMMERCIAL

## 2021-06-16 PROCEDURE — 99072 ADDL SUPL MATRL&STAF TM PHE: CPT

## 2021-06-16 PROCEDURE — 76819 FETAL BIOPHYS PROFIL W/O NST: CPT

## 2021-06-16 PROCEDURE — 76816 OB US FOLLOW-UP PER FETUS: CPT

## 2021-06-22 ENCOUNTER — NON-APPOINTMENT (OUTPATIENT)
Age: 31
End: 2021-06-22

## 2021-06-22 ENCOUNTER — APPOINTMENT (OUTPATIENT)
Dept: OBGYN | Facility: CLINIC | Age: 31
End: 2021-06-22
Payer: COMMERCIAL

## 2021-06-22 VITALS
SYSTOLIC BLOOD PRESSURE: 120 MMHG | BODY MASS INDEX: 37.28 KG/M2 | TEMPERATURE: 97.5 F | WEIGHT: 238 LBS | DIASTOLIC BLOOD PRESSURE: 68 MMHG

## 2021-06-22 LAB
BILIRUB UR QL STRIP: NORMAL
CLARITY UR: CLEAR
COLLECTION METHOD: NORMAL
GLUCOSE UR-MCNC: NORMAL
HCG UR QL: 0.2 EU/DL
HGB UR QL STRIP.AUTO: NORMAL
KETONES UR-MCNC: NORMAL
LEUKOCYTE ESTERASE UR QL STRIP: NORMAL
NITRITE UR QL STRIP: NORMAL
PH UR STRIP: 5
PROT UR STRIP-MCNC: NORMAL
SP GR UR STRIP: 1.01

## 2021-06-22 PROCEDURE — 0502F SUBSEQUENT PRENATAL CARE: CPT

## 2021-06-26 ENCOUNTER — NON-APPOINTMENT (OUTPATIENT)
Age: 31
End: 2021-06-26

## 2021-06-30 ENCOUNTER — NON-APPOINTMENT (OUTPATIENT)
Age: 31
End: 2021-06-30

## 2021-07-06 ENCOUNTER — NON-APPOINTMENT (OUTPATIENT)
Age: 31
End: 2021-07-06

## 2021-07-06 ENCOUNTER — APPOINTMENT (OUTPATIENT)
Dept: OBGYN | Facility: CLINIC | Age: 31
End: 2021-07-06

## 2021-07-06 ENCOUNTER — APPOINTMENT (OUTPATIENT)
Dept: OBGYN | Facility: CLINIC | Age: 31
End: 2021-07-06
Payer: COMMERCIAL

## 2021-07-06 VITALS
WEIGHT: 238 LBS | SYSTOLIC BLOOD PRESSURE: 112 MMHG | BODY MASS INDEX: 37.35 KG/M2 | TEMPERATURE: 97.6 F | HEIGHT: 67 IN | DIASTOLIC BLOOD PRESSURE: 70 MMHG

## 2021-07-06 PROCEDURE — 0502F SUBSEQUENT PRENATAL CARE: CPT

## 2021-07-06 PROCEDURE — 59025 FETAL NON-STRESS TEST: CPT

## 2021-07-06 PROCEDURE — 36415 COLL VENOUS BLD VENIPUNCTURE: CPT

## 2021-07-07 ENCOUNTER — NON-APPOINTMENT (OUTPATIENT)
Age: 31
End: 2021-07-07

## 2021-07-07 LAB
BILIRUB UR QL STRIP: NORMAL
GLUCOSE UR-MCNC: NORMAL
HCG UR QL: 0.2 EU/DL
HGB UR QL STRIP.AUTO: NORMAL
HIV1+2 AB SPEC QL IA.RAPID: NONREACTIVE
KETONES UR-MCNC: 15
LEUKOCYTE ESTERASE UR QL STRIP: NORMAL
NITRITE UR QL STRIP: NORMAL
PH UR STRIP: 7
PROT UR STRIP-MCNC: NORMAL
SP GR UR STRIP: 1.02

## 2021-07-09 ENCOUNTER — TRANSCRIPTION ENCOUNTER (OUTPATIENT)
Age: 31
End: 2021-07-09

## 2021-07-09 LAB — B-HEM STREP SPEC QL CULT: ABNORMAL

## 2021-07-14 ENCOUNTER — APPOINTMENT (OUTPATIENT)
Dept: ANTEPARTUM | Facility: CLINIC | Age: 31
End: 2021-07-14
Payer: COMMERCIAL

## 2021-07-14 ENCOUNTER — ASOB RESULT (OUTPATIENT)
Age: 31
End: 2021-07-14

## 2021-07-14 PROCEDURE — 76816 OB US FOLLOW-UP PER FETUS: CPT

## 2021-07-14 PROCEDURE — 99072 ADDL SUPL MATRL&STAF TM PHE: CPT

## 2021-07-15 ENCOUNTER — NON-APPOINTMENT (OUTPATIENT)
Age: 31
End: 2021-07-15

## 2021-07-21 ENCOUNTER — NON-APPOINTMENT (OUTPATIENT)
Age: 31
End: 2021-07-21

## 2021-07-21 ENCOUNTER — APPOINTMENT (OUTPATIENT)
Dept: OBGYN | Facility: CLINIC | Age: 31
End: 2021-07-21
Payer: COMMERCIAL

## 2021-07-21 VITALS
BODY MASS INDEX: 38.3 KG/M2 | TEMPERATURE: 97.4 F | SYSTOLIC BLOOD PRESSURE: 122 MMHG | HEIGHT: 67 IN | WEIGHT: 244 LBS | DIASTOLIC BLOOD PRESSURE: 90 MMHG

## 2021-07-21 LAB
BILIRUB UR QL STRIP: NORMAL
GLUCOSE UR-MCNC: NORMAL
HCG UR QL: 0.2 EU/DL
HGB UR QL STRIP.AUTO: NORMAL
KETONES UR-MCNC: NORMAL
LEUKOCYTE ESTERASE UR QL STRIP: ABNORMAL
NITRITE UR QL STRIP: NORMAL
PH UR STRIP: 6
PROT UR STRIP-MCNC: NORMAL
SP GR UR STRIP: >=1.03

## 2021-07-21 PROCEDURE — 0502F SUBSEQUENT PRENATAL CARE: CPT

## 2021-07-26 ENCOUNTER — APPOINTMENT (OUTPATIENT)
Dept: ANTEPARTUM | Facility: CLINIC | Age: 31
End: 2021-07-26

## 2021-07-26 ENCOUNTER — APPOINTMENT (OUTPATIENT)
Dept: MATERNAL FETAL MEDICINE | Facility: CLINIC | Age: 31
End: 2021-07-26
Payer: COMMERCIAL

## 2021-07-26 VITALS
HEART RATE: 76 BPM | RESPIRATION RATE: 16 BRPM | DIASTOLIC BLOOD PRESSURE: 68 MMHG | WEIGHT: 245 LBS | HEIGHT: 67 IN | BODY MASS INDEX: 38.45 KG/M2 | OXYGEN SATURATION: 98 % | SYSTOLIC BLOOD PRESSURE: 112 MMHG

## 2021-07-26 DIAGNOSIS — Z34.90 ENCOUNTER FOR SUPERVISION OF NORMAL PREGNANCY, UNSPECIFIED, UNSPECIFIED TRIMESTER: ICD-10-CM

## 2021-07-26 DIAGNOSIS — Z23 ENCOUNTER FOR IMMUNIZATION: ICD-10-CM

## 2021-07-26 DIAGNOSIS — Z34.93 ENCOUNTER FOR SUPERVISION OF NORMAL PREGNANCY, UNSPECIFIED, THIRD TRIMESTER: ICD-10-CM

## 2021-07-26 DIAGNOSIS — Z32.01 ENCOUNTER FOR PREGNANCY TEST, RESULT POSITIVE: ICD-10-CM

## 2021-07-26 DIAGNOSIS — Z34.92 ENCOUNTER FOR SUPERVISION OF NORMAL PREGNANCY, UNSPECIFIED, SECOND TRIMESTER: ICD-10-CM

## 2021-07-26 DIAGNOSIS — Z36.9 ENCOUNTER FOR ANTENATAL SCREENING, UNSPECIFIED: ICD-10-CM

## 2021-07-26 DIAGNOSIS — R10.9 OTHER SPECIFIED PREGNANCY RELATED CONDITIONS, UNSPECIFIED TRIMESTER: ICD-10-CM

## 2021-07-26 DIAGNOSIS — Z34.02 ENCOUNTER FOR SUPERVISION OF NORMAL FIRST PREGNANCY, SECOND TRIMESTER: ICD-10-CM

## 2021-07-26 DIAGNOSIS — R10.30 OTHER SPECIFIED DISEASES AND CONDITIONS COMPLICATING PREGNANCY: ICD-10-CM

## 2021-07-26 DIAGNOSIS — O26.899 OTHER SPECIFIED PREGNANCY RELATED CONDITIONS, UNSPECIFIED TRIMESTER: ICD-10-CM

## 2021-07-26 DIAGNOSIS — O99.891 OTHER SPECIFIED DISEASES AND CONDITIONS COMPLICATING PREGNANCY: ICD-10-CM

## 2021-07-26 PROCEDURE — 99072 ADDL SUPL MATRL&STAF TM PHE: CPT

## 2021-07-26 PROCEDURE — 99204 OFFICE O/P NEW MOD 45 MIN: CPT

## 2021-07-26 PROCEDURE — 99214 OFFICE O/P EST MOD 30 MIN: CPT

## 2021-07-26 NOTE — FAMILY HISTORY
[Age 35+ During Pregnancy] : not 35 or over during pregnancy [Reported Family History Of Birth Defects] : no congenital heart defects [Julian-Sachs Carrier] : no Julian-Sachs [Family History] : no mental retardation/autism [Reported Family History Of Genetic Disease] : no history of child defect in child of baby father [FreeTextEntry1] : pt is a carrier of familial Mediterranean fever,   is negative

## 2021-07-26 NOTE — ACTIVE PROBLEMS
[FreeTextEntry1] : Pt was referred to our office for refusal of blood products as she is a Rastafarian. She has a history of psoriatic arthritis, she states she was on medication prior to pregnancy, not during . She denies any flare up  since pregnancy. She is followed by a Rheumatologist regularly, postpartum follow up unless otherwise indicated.  [Diabetes Mellitus] : no diabetes mellitus [Hypertension] : no hypertension [Heart Disease] : no heart disease [Autoimmune Disease] : no autoimmune disease [Renal Disease] : no kidney disease, no UTI [Neurologic Disorder] : no neurologic disorder, no epilepsy [Psychiatric Disorders] : no psychiatric disorders [Depression] : no depression, no post partum depression [Hepatic Disorder] : no hepatitis, no liver disease [Thrombophlebitis] : no varicosities, no phlebitis [Thyroid Disorder] : no thyroid dysfunction [Trauma] : no trauma/violence [Blood Transfusion (___ Ml)] : no history of blood transfusion

## 2021-07-26 NOTE — SURGICAL HISTORY
[Fibroids] : no fibroids [Abn Paps] : no abnormal pap smears [Breast Disease] : no breast disease [STI's] : no STI's [Infertility] : no infertility [Cysts] : cysts [OC Use] : no OC use [Last Pap: ___] : Last Pap: [unfilled] [Last Mammo: ___] : Last Mammo: none

## 2021-07-26 NOTE — OB HISTORY
[LMP: ___] : LMP: [unfilled] [CHARU: ___] : CHARU: [unfilled] [EGA: ___ wks] : EGA: [unfilled] wks [Spontaneous] : Spontaneous conception [Sonogram] : sonogram [at ___ wks] : at [unfilled] weeks [Definite:  ___ (Date)] : the last menstrual period was [unfilled]

## 2021-07-26 NOTE — VITALS
[LMP (date): ___] : LMP was on [unfilled] [GA =___ Weeks] : which calculates to a GA of [unfilled] weeks [GA= ___ Days] : and [unfilled] day(s) [CHARU by LMP (date): ___] : The calculated CHARU by LMP is [unfilled] [By LMP] : this is the final CHARU

## 2021-07-26 NOTE — DISCUSSION/SUMMARY
[FreeTextEntry1] : We had the pleasure of seeing your patient, Selma Roberto, for a Maternal-Fetal Medicine consultation today. She is a 30-year-old  at 38 2/7 weeks of gestation. Her pregnancy is complicated by refusal of blood products, and obesity.\par \par She has no acute complaints today. Denies cramping, leaking, and vaginal bleeding. \par \par She is taking prenatal vitamins.\par \par She was extensively counseled regarding the following issues:\par \par •	Declines Blood Products (Jehovah’s Witness)\par \par The patient has indicated that she would not accept blood products due to Baptism objections. She has not had a bleeding event in the past in which the physicians recommended a blood transfusion. She does not have a known bleeding disorder. The Blood Product Preference form was reviewed with the patient; she completed the form after carefully considering each option. She understands the risk of refusing blood products, including maternal death, and understands that blood alternatives and derivatives may not provide the same benefit as a direct blood product. She indicated that she would prefer to put her life at risk and potentially die, rather than accept a product which is on her "will not accept" list. It was discussed that postpartum hemorrhage can complicate up to 5% of all deliveries and approximately 1-2% of patients receive blood transfusions. Because uterine atony is the most common cause of postpartum hemorrhage, standard treatments were briefly discussed, including administration of uterotonic agents, use of surgical techniques to achieve hemostasis, and the possible need for hysterectomy. All questions were answered to her apparent satisfaction. \par \par •	Obesity, class 2\par \par Obesity is associated with an increased risk for pregnancy complications such as gestational diabetes and preeclampsia. Complications from surgery are increased, as well as failure of regional anesthesia. In addition, the fetus is at increased risk for congenital anomalies, most commonly neural tube defects and cardiac anomalies, even in the absence of diabetes.  Malformations are more difficult to assess by ultrasound evaluation due to the decreased sensitivity of ultrasound in women with a high BMI. During pregnancy, optimum weight gain recommended by the [place] [employer] 2009 Guidelines is 11-20 pounds. Furthermore, pregnant women with obesity are at a greater risk for venous thromboembolism. If a  section is performed, chemoprophylaxis is recommended during postpartum hospitalization. \par \par \par Thank you for requesting a consultation on this patient for refusal of blood products. The total time spent in preparation for this visit, medical history taking, orders, review of records, counseling the patient, and writing this note was 45 minutes.\par \par At the end of our discussion, the patient indicated that her questions were answered and she seemed satisfied with our discussion. Please do not hesitate to contact us with any questions.\par \par Sincerely,\par \par \par Graham Garcia MD, HALINA\par Attending Physician, Maternal-Fetal Medicine\par

## 2021-07-27 ENCOUNTER — NON-APPOINTMENT (OUTPATIENT)
Age: 31
End: 2021-07-27

## 2021-07-27 ENCOUNTER — APPOINTMENT (OUTPATIENT)
Dept: OBGYN | Facility: CLINIC | Age: 31
End: 2021-07-27
Payer: COMMERCIAL

## 2021-07-27 ENCOUNTER — ASOB RESULT (OUTPATIENT)
Age: 31
End: 2021-07-27

## 2021-07-27 VITALS
HEIGHT: 67 IN | SYSTOLIC BLOOD PRESSURE: 112 MMHG | WEIGHT: 244 LBS | BODY MASS INDEX: 38.3 KG/M2 | DIASTOLIC BLOOD PRESSURE: 66 MMHG

## 2021-07-27 PROCEDURE — 0502F SUBSEQUENT PRENATAL CARE: CPT

## 2021-07-27 PROCEDURE — 76818 FETAL BIOPHYS PROFILE W/NST: CPT

## 2021-07-27 PROCEDURE — 99072 ADDL SUPL MATRL&STAF TM PHE: CPT

## 2021-07-27 PROCEDURE — 59425 ANTEPARTUM CARE ONLY: CPT

## 2021-07-28 ENCOUNTER — NON-APPOINTMENT (OUTPATIENT)
Age: 31
End: 2021-07-28

## 2021-07-28 LAB
BILIRUB UR QL STRIP: NORMAL
GLUCOSE UR-MCNC: NORMAL
HCG UR QL: 0.2 EU/DL
HGB UR QL STRIP.AUTO: NORMAL
KETONES UR-MCNC: NORMAL
LEUKOCYTE ESTERASE UR QL STRIP: NORMAL
NITRITE UR QL STRIP: NORMAL
PH UR STRIP: 7
PROT UR STRIP-MCNC: NORMAL
SP GR UR STRIP: 1.01

## 2021-08-03 ENCOUNTER — NON-APPOINTMENT (OUTPATIENT)
Age: 31
End: 2021-08-03

## 2021-08-03 ENCOUNTER — APPOINTMENT (OUTPATIENT)
Dept: OBGYN | Facility: CLINIC | Age: 31
End: 2021-08-03
Payer: COMMERCIAL

## 2021-08-03 ENCOUNTER — ASOB RESULT (OUTPATIENT)
Age: 31
End: 2021-08-03

## 2021-08-03 VITALS
BODY MASS INDEX: 38.45 KG/M2 | DIASTOLIC BLOOD PRESSURE: 80 MMHG | WEIGHT: 245 LBS | HEIGHT: 67 IN | SYSTOLIC BLOOD PRESSURE: 120 MMHG

## 2021-08-03 LAB
BILIRUB UR QL STRIP: NORMAL
GLUCOSE UR-MCNC: NORMAL
HCG UR QL: 0.2 EU/DL
HGB UR QL STRIP.AUTO: NORMAL
KETONES UR-MCNC: NORMAL
LEUKOCYTE ESTERASE UR QL STRIP: NORMAL
NITRITE UR QL STRIP: NORMAL
PH UR STRIP: 7
PROT UR STRIP-MCNC: NORMAL
SP GR UR STRIP: 1.02

## 2021-08-03 PROCEDURE — 76818 FETAL BIOPHYS PROFILE W/NST: CPT

## 2021-08-03 PROCEDURE — 0502F SUBSEQUENT PRENATAL CARE: CPT

## 2021-08-06 ENCOUNTER — NON-APPOINTMENT (OUTPATIENT)
Age: 31
End: 2021-08-06

## 2021-08-06 ENCOUNTER — INPATIENT (INPATIENT)
Facility: HOSPITAL | Age: 31
LOS: 2 days | Discharge: HOME | End: 2021-08-09
Attending: OBSTETRICS & GYNECOLOGY | Admitting: OBSTETRICS & GYNECOLOGY
Payer: COMMERCIAL

## 2021-08-06 VITALS
RESPIRATION RATE: 16 BRPM | SYSTOLIC BLOOD PRESSURE: 123 MMHG | DIASTOLIC BLOOD PRESSURE: 82 MMHG | HEART RATE: 73 BPM | TEMPERATURE: 98 F

## 2021-08-06 DIAGNOSIS — O47.1 FALSE LABOR AT OR AFTER 37 COMPLETED WEEKS OF GESTATION: ICD-10-CM

## 2021-08-06 DIAGNOSIS — O26.893 OTHER SPECIFIED PREGNANCY RELATED CONDITIONS, THIRD TRIMESTER: ICD-10-CM

## 2021-08-06 LAB
APPEARANCE UR: CLEAR — SIGNIFICANT CHANGE UP
BASOPHILS # BLD AUTO: 0.02 K/UL — SIGNIFICANT CHANGE UP (ref 0–0.2)
BASOPHILS NFR BLD AUTO: 0.1 % — SIGNIFICANT CHANGE UP (ref 0–2)
BILIRUB UR-MCNC: NEGATIVE — SIGNIFICANT CHANGE UP
BLD GP AB SCN SERPL QL: SIGNIFICANT CHANGE UP
COLOR SPEC: YELLOW — SIGNIFICANT CHANGE UP
DIFF PNL FLD: ABNORMAL
EOSINOPHIL # BLD AUTO: 0.04 K/UL — SIGNIFICANT CHANGE UP (ref 0–0.5)
EOSINOPHIL NFR BLD AUTO: 0.2 % — SIGNIFICANT CHANGE UP (ref 0–6)
EPI CELLS # UR: SIGNIFICANT CHANGE UP
GLUCOSE UR QL: NEGATIVE MG/DL — SIGNIFICANT CHANGE UP
HCT VFR BLD CALC: 41.8 % — SIGNIFICANT CHANGE UP (ref 34.5–45)
HGB BLD-MCNC: 13.7 G/DL — SIGNIFICANT CHANGE UP (ref 11.5–15.5)
IMM GRANULOCYTES NFR BLD AUTO: 0.4 % — SIGNIFICANT CHANGE UP (ref 0–1.5)
KETONES UR-MCNC: NEGATIVE — SIGNIFICANT CHANGE UP
LEUKOCYTE ESTERASE UR-ACNC: NEGATIVE — SIGNIFICANT CHANGE UP
LYMPHOCYTES # BLD AUTO: 15.1 % — SIGNIFICANT CHANGE UP (ref 13–44)
LYMPHOCYTES # BLD AUTO: 2.44 K/UL — SIGNIFICANT CHANGE UP (ref 1–3.3)
MCHC RBC-ENTMCNC: 29.2 PG — SIGNIFICANT CHANGE UP (ref 27–34)
MCHC RBC-ENTMCNC: 32.8 GM/DL — SIGNIFICANT CHANGE UP (ref 32–36)
MCV RBC AUTO: 89.1 FL — SIGNIFICANT CHANGE UP (ref 80–100)
MONOCYTES # BLD AUTO: 1.04 K/UL — HIGH (ref 0–0.9)
MONOCYTES NFR BLD AUTO: 6.4 % — SIGNIFICANT CHANGE UP (ref 2–14)
NEUTROPHILS # BLD AUTO: 12.57 K/UL — HIGH (ref 1.8–7.4)
NEUTROPHILS NFR BLD AUTO: 77.8 % — HIGH (ref 43–77)
NITRITE UR-MCNC: NEGATIVE — SIGNIFICANT CHANGE UP
PH UR: 7 — SIGNIFICANT CHANGE UP (ref 5–8)
PLATELET # BLD AUTO: 239 K/UL — SIGNIFICANT CHANGE UP (ref 150–400)
PROT UR-MCNC: 15 MG/DL
RBC # BLD: 4.69 M/UL — SIGNIFICANT CHANGE UP (ref 3.8–5.2)
RBC # FLD: 13.5 % — SIGNIFICANT CHANGE UP (ref 10.3–14.5)
RBC CASTS # UR COMP ASSIST: SIGNIFICANT CHANGE UP /HPF (ref 0–4)
SARS-COV-2 RNA SPEC QL NAA+PROBE: SIGNIFICANT CHANGE UP
SP GR SPEC: 1.01 — SIGNIFICANT CHANGE UP (ref 1.01–1.02)
UROBILINOGEN FLD QL: NEGATIVE MG/DL — SIGNIFICANT CHANGE UP
WBC # BLD: 16.17 K/UL — HIGH (ref 3.8–10.5)
WBC # FLD AUTO: 16.17 K/UL — HIGH (ref 3.8–10.5)
WBC UR QL: NEGATIVE — SIGNIFICANT CHANGE UP

## 2021-08-06 RX ORDER — CITRIC ACID/SODIUM CITRATE 300-500 MG
30 SOLUTION, ORAL ORAL ONCE
Refills: 0 | Status: COMPLETED | OUTPATIENT
Start: 2021-08-06 | End: 2021-08-07

## 2021-08-06 RX ORDER — AMPICILLIN TRIHYDRATE 250 MG
2 CAPSULE ORAL ONCE
Refills: 0 | Status: COMPLETED | OUTPATIENT
Start: 2021-08-06 | End: 2021-08-06

## 2021-08-06 RX ORDER — AMPICILLIN TRIHYDRATE 250 MG
2 CAPSULE ORAL EVERY 6 HOURS
Refills: 0 | Status: DISCONTINUED | OUTPATIENT
Start: 2021-08-06 | End: 2021-08-06

## 2021-08-06 RX ORDER — OXYTOCIN 10 UNIT/ML
333.33 VIAL (ML) INJECTION
Qty: 20 | Refills: 0 | Status: DISCONTINUED | OUTPATIENT
Start: 2021-08-06 | End: 2021-08-09

## 2021-08-06 RX ORDER — AMPICILLIN TRIHYDRATE 250 MG
1 CAPSULE ORAL EVERY 4 HOURS
Refills: 0 | Status: DISCONTINUED | OUTPATIENT
Start: 2021-08-06 | End: 2021-08-07

## 2021-08-06 RX ORDER — SODIUM CHLORIDE 9 MG/ML
1000 INJECTION, SOLUTION INTRAVENOUS
Refills: 0 | Status: DISCONTINUED | OUTPATIENT
Start: 2021-08-06 | End: 2021-08-07

## 2021-08-06 RX ADMIN — SODIUM CHLORIDE 125 MILLILITER(S): 9 INJECTION, SOLUTION INTRAVENOUS at 22:14

## 2021-08-06 RX ADMIN — Medication 216 GRAM(S): at 22:15

## 2021-08-06 NOTE — OB PROVIDER H&P - ASSESSMENT
30year old G1 at 39w6d presented to [facility] L&D triage with early labor  -admit to L&D  -routine labs  -augment with pitocin if contractions space out  -anticipate   -no blood products due to Jehovah Witness, the blood refusal consent reviewed at the [facility] office  -IV fluids  -discussed with Dr. Nathan

## 2021-08-06 NOTE — OB RN PATIENT PROFILE - PRO PRENATAL LABS ORI SOURCE HIV
Left mom a voicemail for call back. Urine culture came back negative so no infection.      ----- Message from Mayco Raza MD sent at 9/30/2017 12:50 PM CDT -----  Inform of negative culture  ----- Message -----     From: Lab, Background User     Sent: 9/28/2017   9:35 AM       To: Mayco Raza MD          
hard copy, drawn during this pregnancy

## 2021-08-06 NOTE — OB PROVIDER H&P - HISTORY OF PRESENT ILLNESS
30year old G1 at 39w6d presented to [facility] L&D triage with irregular painful contractions every 2-6 min apart. Denies vaginal bleeding, discharge. Reports good fetal movement. Denies headache, SOB, chest pain. Denies symptoms of dysuria  Patient does not want epidural  GBS positive  Obesity    PMH: psoriatic arthritis, familial mediterranean disease  PSH: none  OBH: regular cycles, no history of STI  Alllergy: Naprosyn, not allergic to Ibuprofen or toradol  meds: PNV  Preg complications: none    T(C): 36.7 (21 @ 17:54), Max: 36.7 (21 @ 17:54)  HR: 73 (21 @ 17:54) (73 - 73)  BP: 123/82 (21 @ 17:54) (123/82 - 123/82)  RR: 16 (21 @ 17:54) (16 - 16)  Gen: NAD  Pulm: CTABL  CVR: RRR nl S1 S2  Abd: softly distended, gravid, + BS   Pelvic:  3cm/70%/-2 station; vertex  Tracin bpm, moderate, + accelerations, - decelerations

## 2021-08-07 ENCOUNTER — TRANSCRIPTION ENCOUNTER (OUTPATIENT)
Age: 31
End: 2021-08-07

## 2021-08-07 ENCOUNTER — NON-APPOINTMENT (OUTPATIENT)
Age: 31
End: 2021-08-07

## 2021-08-07 LAB
COVID-19 SPIKE DOMAIN AB INTERP: POSITIVE
COVID-19 SPIKE DOMAIN ANTIBODY RESULT: >250 U/ML — HIGH
SARS-COV-2 IGG+IGM SERPL QL IA: >250 U/ML — HIGH
SARS-COV-2 IGG+IGM SERPL QL IA: POSITIVE
T PALLIDUM AB TITR SER: NEGATIVE — SIGNIFICANT CHANGE UP

## 2021-08-07 PROCEDURE — 59410 OBSTETRICAL CARE: CPT

## 2021-08-07 RX ORDER — PRAMOXINE HYDROCHLORIDE 150 MG/15G
1 AEROSOL, FOAM RECTAL EVERY 4 HOURS
Refills: 0 | Status: DISCONTINUED | OUTPATIENT
Start: 2021-08-07 | End: 2021-08-09

## 2021-08-07 RX ORDER — AER TRAVELER 0.5 G/1
1 SOLUTION RECTAL; TOPICAL EVERY 4 HOURS
Refills: 0 | Status: DISCONTINUED | OUTPATIENT
Start: 2021-08-07 | End: 2021-08-09

## 2021-08-07 RX ORDER — DIPHENHYDRAMINE HCL 50 MG
25 CAPSULE ORAL ONCE
Refills: 0 | Status: COMPLETED | OUTPATIENT
Start: 2021-08-07 | End: 2021-08-07

## 2021-08-07 RX ORDER — ACETAMINOPHEN 500 MG
975 TABLET ORAL
Refills: 0 | Status: DISCONTINUED | OUTPATIENT
Start: 2021-08-07 | End: 2021-08-09

## 2021-08-07 RX ORDER — LANOLIN
1 OINTMENT (GRAM) TOPICAL EVERY 6 HOURS
Refills: 0 | Status: DISCONTINUED | OUTPATIENT
Start: 2021-08-07 | End: 2021-08-09

## 2021-08-07 RX ORDER — TRANEXAMIC ACID 100 MG/ML
1000 INJECTION, SOLUTION INTRAVENOUS ONCE
Refills: 0 | Status: COMPLETED | OUTPATIENT
Start: 2021-08-07 | End: 2021-08-07

## 2021-08-07 RX ORDER — OXYCODONE HYDROCHLORIDE 5 MG/1
5 TABLET ORAL
Refills: 0 | Status: DISCONTINUED | OUTPATIENT
Start: 2021-08-07 | End: 2021-08-09

## 2021-08-07 RX ORDER — DIPHENHYDRAMINE HCL 50 MG
25 CAPSULE ORAL ONCE
Refills: 0 | Status: DISCONTINUED | OUTPATIENT
Start: 2021-08-07 | End: 2021-08-07

## 2021-08-07 RX ORDER — MAGNESIUM HYDROXIDE 400 MG/1
30 TABLET, CHEWABLE ORAL
Refills: 0 | Status: DISCONTINUED | OUTPATIENT
Start: 2021-08-07 | End: 2021-08-09

## 2021-08-07 RX ORDER — HYDROCORTISONE 1 %
1 OINTMENT (GRAM) TOPICAL EVERY 6 HOURS
Refills: 0 | Status: DISCONTINUED | OUTPATIENT
Start: 2021-08-07 | End: 2021-08-09

## 2021-08-07 RX ORDER — BENZOCAINE 10 %
1 GEL (GRAM) MUCOUS MEMBRANE EVERY 6 HOURS
Refills: 0 | Status: DISCONTINUED | OUTPATIENT
Start: 2021-08-07 | End: 2021-08-09

## 2021-08-07 RX ORDER — DIPHENHYDRAMINE HCL 50 MG
25 CAPSULE ORAL EVERY 6 HOURS
Refills: 0 | Status: DISCONTINUED | OUTPATIENT
Start: 2021-08-07 | End: 2021-08-07

## 2021-08-07 RX ORDER — OXYTOCIN 10 UNIT/ML
2 VIAL (ML) INJECTION
Qty: 30 | Refills: 0 | Status: DISCONTINUED | OUTPATIENT
Start: 2021-08-07 | End: 2021-08-09

## 2021-08-07 RX ORDER — TETANUS TOXOID, REDUCED DIPHTHERIA TOXOID AND ACELLULAR PERTUSSIS VACCINE, ADSORBED 5; 2.5; 8; 8; 2.5 [IU]/.5ML; [IU]/.5ML; UG/.5ML; UG/.5ML; UG/.5ML
0.5 SUSPENSION INTRAMUSCULAR ONCE
Refills: 0 | Status: DISCONTINUED | OUTPATIENT
Start: 2021-08-07 | End: 2021-08-09

## 2021-08-07 RX ORDER — DIBUCAINE 1 %
1 OINTMENT (GRAM) RECTAL EVERY 6 HOURS
Refills: 0 | Status: DISCONTINUED | OUTPATIENT
Start: 2021-08-07 | End: 2021-08-09

## 2021-08-07 RX ORDER — OXYTOCIN 10 UNIT/ML
333.33 VIAL (ML) INJECTION
Qty: 20 | Refills: 0 | Status: DISCONTINUED | OUTPATIENT
Start: 2021-08-07 | End: 2021-08-09

## 2021-08-07 RX ORDER — KETOROLAC TROMETHAMINE 30 MG/ML
30 SYRINGE (ML) INJECTION ONCE
Refills: 0 | Status: DISCONTINUED | OUTPATIENT
Start: 2021-08-07 | End: 2021-08-07

## 2021-08-07 RX ORDER — IBUPROFEN 200 MG
600 TABLET ORAL EVERY 6 HOURS
Refills: 0 | Status: DISCONTINUED | OUTPATIENT
Start: 2021-08-07 | End: 2021-08-09

## 2021-08-07 RX ORDER — IBUPROFEN 200 MG
600 TABLET ORAL EVERY 6 HOURS
Refills: 0 | Status: COMPLETED | OUTPATIENT
Start: 2021-08-07 | End: 2022-07-06

## 2021-08-07 RX ORDER — DIPHENHYDRAMINE HCL 50 MG
25 CAPSULE ORAL EVERY 6 HOURS
Refills: 0 | Status: DISCONTINUED | OUTPATIENT
Start: 2021-08-07 | End: 2021-08-09

## 2021-08-07 RX ORDER — ACETAMINOPHEN 500 MG
3 TABLET ORAL
Qty: 36 | Refills: 0
Start: 2021-08-07 | End: 2021-08-09

## 2021-08-07 RX ORDER — SODIUM CHLORIDE 9 MG/ML
3 INJECTION INTRAMUSCULAR; INTRAVENOUS; SUBCUTANEOUS EVERY 8 HOURS
Refills: 0 | Status: DISCONTINUED | OUTPATIENT
Start: 2021-08-07 | End: 2021-08-09

## 2021-08-07 RX ORDER — SIMETHICONE 80 MG/1
80 TABLET, CHEWABLE ORAL EVERY 4 HOURS
Refills: 0 | Status: DISCONTINUED | OUTPATIENT
Start: 2021-08-07 | End: 2021-08-09

## 2021-08-07 RX ORDER — TRANEXAMIC ACID 100 MG/ML
1000 INJECTION, SOLUTION INTRAVENOUS ONCE
Refills: 0 | Status: COMPLETED | OUTPATIENT
Start: 2021-08-08 | End: 2021-08-07

## 2021-08-07 RX ORDER — OXYCODONE HYDROCHLORIDE 5 MG/1
5 TABLET ORAL ONCE
Refills: 0 | Status: DISCONTINUED | OUTPATIENT
Start: 2021-08-07 | End: 2021-08-09

## 2021-08-07 RX ADMIN — Medication 108 GRAM(S): at 06:15

## 2021-08-07 RX ADMIN — SODIUM CHLORIDE 125 MILLILITER(S): 9 INJECTION, SOLUTION INTRAVENOUS at 14:05

## 2021-08-07 RX ADMIN — SODIUM CHLORIDE 125 MILLILITER(S): 9 INJECTION, SOLUTION INTRAVENOUS at 00:07

## 2021-08-07 RX ADMIN — Medication 30 MILLIGRAM(S): at 16:50

## 2021-08-07 RX ADMIN — Medication 30 MILLILITER(S): at 00:00

## 2021-08-07 RX ADMIN — Medication 2 MILLIUNIT(S)/MIN: at 05:45

## 2021-08-07 RX ADMIN — Medication 108 GRAM(S): at 09:52

## 2021-08-07 RX ADMIN — Medication 30 MILLIGRAM(S): at 17:26

## 2021-08-07 RX ADMIN — Medication 108 GRAM(S): at 02:15

## 2021-08-07 RX ADMIN — SODIUM CHLORIDE 125 MILLILITER(S): 9 INJECTION, SOLUTION INTRAVENOUS at 09:37

## 2021-08-07 RX ADMIN — Medication 108 GRAM(S): at 14:05

## 2021-08-07 RX ADMIN — Medication 25 MILLIGRAM(S): at 04:30

## 2021-08-07 RX ADMIN — TRANEXAMIC ACID 220 MILLIGRAM(S): 100 INJECTION, SOLUTION INTRAVENOUS at 14:30

## 2021-08-07 RX ADMIN — Medication 1000 MILLIUNIT(S)/MIN: at 16:39

## 2021-08-07 RX ADMIN — SODIUM CHLORIDE 3 MILLILITER(S): 9 INJECTION INTRAMUSCULAR; INTRAVENOUS; SUBCUTANEOUS at 22:35

## 2021-08-07 RX ADMIN — TRANEXAMIC ACID 220 MILLIGRAM(S): 100 INJECTION, SOLUTION INTRAVENOUS at 22:30

## 2021-08-07 NOTE — DISCHARGE NOTE OB - MEDICATION SUMMARY - MEDICATIONS TO TAKE
I will START or STAY ON the medications listed below when I get home from the hospital:    acetaminophen 325 mg oral capsule  -- 3 cap(s) by mouth every 6 hours   -- Indication: For Pain   I will START or STAY ON the medications listed below when I get home from the hospital:    ibuprofen 600 mg oral tablet  -- 1 tab(s) by mouth every 6 hours, As Needed -for mild pain   -- Do not take this drug if you are pregnant.  It is very important that you take or use this exactly as directed.  Do not skip doses or discontinue unless directed by your doctor.  May cause drowsiness or dizziness.  Obtain medical advice before taking any non-prescription drugs as some may affect the action of this medication.  Take with food or milk.    -- Indication: For Pain    Tylenol 325 mg oral tablet  -- 2 tab(s) by mouth every 6 hours, As Needed -for moderate pain   -- This product contains acetaminophen.  Do not use  with any other product containing acetaminophen to prevent possible liver damage.    -- Indication: For Pain

## 2021-08-07 NOTE — OB PROVIDER LABOR PROGRESS NOTE - ASSESSMENT
- T cat 1  - Continue current management  - Anesthesia called for epidural bolus  - Continue pitocin  - Continue to monitor
Pt admitted in labor    -VSS  -FHR tracing Cat. 1  -Continue pitocin management  -Epidural in place    Discussed with Dr. Kennedy.
A/P:  Patient admitted in early labor  AROM blood-tinged fluid  Will watch for incr in ctxs, if pattern not regular will start pitocin    Plan D/w Dr. Spivey
Pt admitted in labor    -VSS  -FHR tracing Cat. 1  -Continue pitocin  -Epidural in place  -Continue to monitor as patient begins to feel constant rectal pressure.       Discussed with Dr. Kennedy

## 2021-08-07 NOTE — DISCHARGE NOTE OB - PLAN OF CARE
Rapid recovery 1) Please take ibuprofen and/or Tylenol as needed for pain as prescribed.  2) Nothing in the vagina for 6 weeks (including no sex, no tampons, and no douching).  3) Please call your doctor for a follow up your postpartum appointment in 4 weeks.  4) Please continue taking vitamins postpartum. Take iron and colace for acute blood loss anemia.  5) Please call the office sooner if you have heavy vaginal bleeding, severe abdominal pain, or fever > 100.4F.  6) You may resume regular daily activity as tolerated

## 2021-08-07 NOTE — DISCHARGE NOTE OB - CARE PROVIDER_API CALL
Jorge Kenneyd)  Obstetrics and Gynecology  [address], [address]  [place] [address]  Phone: [phone]  Fax: [phone]  Follow Up Time:

## 2021-08-07 NOTE — OB PROVIDER LABOR PROGRESS NOTE - NS_SUBJECTIVE/OBJECTIVE_OBGYN_ALL_OB_FT
Patient report rectal pressure
Pt feeling increased rectal pressure.
Patient seen at bedside, resting comfortably with epidural in place
Pt resting comfortably.

## 2021-08-07 NOTE — DISCHARGE NOTE OB - PATIENT PORTAL LINK FT
You can access the FollowMyHealth Patient Portal offered by [facility] by registering at the following website: [email or web address]. By joining [employer]’s FollowMyHealth portal, you will also be able to view your health information using other applications (apps) compatible with our system.

## 2021-08-07 NOTE — DISCHARGE NOTE OB - HISTORY OF COVID-19 VACCINATION
PNF for agonist/antagonist inhibition          Pt education/reminders 5' Pt education with HEP     Therapeutic Exercise and NMR EXR  [x] (43729) Provided verbal/tactile cueing for activities related to strengthening, flexibility, endurance, ROM for improvements in LE, proximal hip, and core control with self care, mobility, lifting, ambulation. [x] (83945) Provided verbal/tactile cueing for activities related to improving balance, coordination, kinesthetic sense, posture, motor skill, proprioception  to assist with LE, proximal hip, and core control in self care, mobility, lifting, ambulation and eccentric single leg control. Home Exercise Program:    [x] (92910) Reviewed/Progressed HEP activities related to strengthening, flexibility, endurance, ROM of core, proximal hip and LE for functional self-care, mobility, lifting and ambulation/stair navigation   [x] (83862)Reviewed/Progressed HEP activities related to improving balance, coordination, kinesthetic sense, posture, motor skill, proprioception of core, proximal hip and LE for self care, mobility, lifting, and ambulation/stair navigation      Manual Treatments:  PROM / STM / Oscillations-Mobs:  G-I, II, III, IV (PA's, Inf., Post.)  [x] (53216) Provided manual therapy to mobilize LE, proximal hip and/or LS spine soft tissue/joints for the purpose of modulating pain, promoting relaxation,  increasing ROM, reducing/eliminating soft tissue swelling/inflammation/restriction, improving soft tissue extensibility and allowing for proper ROM for normal function with self care, mobility, lifting and ambulation.      Modalities: n/a    Charges:  Timed Code Treatment Minutes: 40'   Total Treatment Minutes: 54'     [] EVAL (LOW) 33208 (typically 20 minutes face-to-face)  [] EVAL (MOD) 70709 (typically 30 minutes face-to-face)  [] EVAL (HIGH) 41599 (typically 45 minutes face-to-face)  [] RE-EVAL     [x] ZX(05300) x  1   [] Ionto  [x] NMR (64982) x  1   [] Vaso  [x] Manual (57935) x  1    [] Mech Traction  [] ES (unattended):   [] Other:     GOALS:  Short Term Goals: To be achieved in: 2 weeks  1. Independent in HEP and progression per patient tolerance, in order to prevent re-injury. 2. Patient will have a decrease in pain to facilitate improvement in movement, function, and ADLs as indicated by Functional Deficits. Long Term Goals: To be achieved in: 10 weeks  1. Disability index score of 10% or less for the Modified Oswestry Low back pain to assist with reaching prior level of function. 2. Patient will demonstrate increased AROM to equal the opposite side bilaterally to allow for proper joint functioning as indicated by patients Functional Deficits. 3. Patient will demonstrate an increase in strength of Left LE = Right LE to allow for proper functional mobility as indicated by patients Functional Deficits. 4. Patient will return to all transfers, work activities, and functional activities without increased symptoms or restriction. 5. Patient will have 0/10 pain with ADL's.  6. Patient stated goal: to relieve pain and improve sitting duration tolerance    Goals that are underlined signify the goal has been accomplished. Progression Towards Functional goals:  [x] Patient is progressing as expected towards functional goals listed. [] Progression is slowed due to complexities listed. [] Progression has been slowed due to co-morbidities.   [] Plan just implemented, too soon to assess goals progression  [] Other:     ASSESSMENT:  See eval    Treatment/Activity Tolerance:   [x] Patient tolerated treatment well [] Patient limited by fatique  [] Patient limited by pain  [] Patient limited by other medical complications  [] Other:     Prognosis: [x] Good [] Fair  [] Poor    Patient Requires Follow-up: [x] Yes  [] No    PLAN: See eval  [x] Continue per plan of care [] Alter current plan (see comments)  [] Plan of care initiated [] Hold pending MD visit [] Discharge    Electronically signed by: Samm Byrd PTA Vaccine status unknown

## 2021-08-07 NOTE — OB PROVIDER DELIVERY SUMMARY - NSPROVIDERDELIVERYNOTE_OBGYN_ALL_OB_FT
30y  presenting in labor.     at 1639 of a live male infant with Apgars 7/9. Delivered OA, true knot in nuchal cord, bloody fluid. Infant's head delivered with maternal expulsive efforts. Shoulders delivered without difficulty followed by the rest of the body. Nose and mouth were bulb suctioned. Cord clamped and cut after delay. Samples obtained. Baby handed to patient. Placenta delivered spontaneously, intact at 1642. Pitocin started. Excellent hemostasis achieved. Cervix, perineum and vagina were inspected and no lacerations were noted. EBL 173cc. Pt tolerated procedure well, in stable condition, recovering in LDR. Infant in LDR. Instrument/sponge count correct x 2 and confirmed by nurse.

## 2021-08-07 NOTE — DISCHARGE NOTE OB - CARE PLAN
Principal Discharge DX:	Delivery normal  Goal:	Rapid recovery  Assessment and plan of treatment:	1) Please take ibuprofen and/or Tylenol as needed for pain as prescribed.  2) Nothing in the vagina for 6 weeks (including no sex, no tampons, and no douching).  3) Please call your doctor for a follow up your postpartum appointment in 4 weeks.  4) Please continue taking vitamins postpartum. Take iron and colace for acute blood loss anemia.  5) Please call the office sooner if you have heavy vaginal bleeding, severe abdominal pain, or fever > 100.4F.  6) You may resume regular daily activity as tolerated

## 2021-08-07 NOTE — OB PROVIDER DELIVERY SUMMARY - NSSELHIDDEN_OBGYN_ALL_OB_FT
[NS_DeliveryAttending1_OBGYN_ALL_OB_FT:[id number]==],[NS_DeliveryAssist1_OBGYN_ALL_OB_FT:[id number]=],[NS_DeliveryRN_OBGYN_ALL_OB_FT:[id number]==]

## 2021-08-07 NOTE — OB NEONATOLOGY/PEDIATRICIAN DELIVERY SUMMARY - NSPEDSNEONOTESA_OBGYN_ALL_OB_FT
Dr. Kennedy requested me to evaluate 3-4 min old baby boy born via  at 40weeks. The mother is 29 y/o, O+, HIV, RPR, HBsAg, are  NR, RI, GBS +, adequately Rx with Amp x 5 doses.  L & D: true knot, floppy, poor color, stimulated and dried, CPAP x 30 sec x 2 with improvement of color, the was breathing but no at loud cry. APGAR 7 & 9, BW: 3665gm  Asst: full term appropriate for gestational age, BB, , poor transition  Plan: observe in transition, if remain stable, admit to NBN.

## 2021-08-07 NOTE — OB RN DELIVERY SUMMARY - NSSELHIDDEN_OBGYN_ALL_OB_FT
[NS_DeliveryAttending1_OBGYN_ALL_OB_FT:[id number]==],[NS_DeliveryAssist1_OBGYN_ALL_OB_FT:[id number]],[NS_DeliveryRN_OBGYN_ALL_OB_FT:[id number]==]

## 2021-08-07 NOTE — OB PROVIDER LABOR PROGRESS NOTE - NS_OBIHIFHRDETAILS_OBGYN_ALL_OB_FT
120bpm - cat 1 tracing
Baseline 145, moderate variability, + accels, - decels
baseline 145, moderate variability, +accels, -decels
baseline 140s, moderate variability, +accels, -decels

## 2021-08-07 NOTE — OB RN DELIVERY SUMMARY - NS_SEPSISRSKCALC_OBGYN_ALL_OB_FT
EOS calculated successfully. EOS Risk Factor: 0.5/1000 live births ([place] national incidence); GA=40w;Temp=98.42; ROM=15.7; GBS='Positive'; Antibiotics='Broad spectrum antibiotics > 4 hrs prior to birth'

## 2021-08-08 LAB
HCT VFR BLD CALC: 31.6 % — LOW (ref 34.5–45)
HGB BLD-MCNC: 10.4 G/DL — LOW (ref 11.5–15.5)

## 2021-08-08 RX ORDER — IBUPROFEN 200 MG
1 TABLET ORAL
Qty: 28 | Refills: 0
Start: 2021-08-08 | End: 2021-08-14

## 2021-08-08 RX ORDER — ACETAMINOPHEN 500 MG
2 TABLET ORAL
Qty: 56 | Refills: 0
Start: 2021-08-08 | End: 2021-08-14

## 2021-08-08 RX ADMIN — Medication 600 MILLIGRAM(S): at 17:16

## 2021-08-08 RX ADMIN — Medication 600 MILLIGRAM(S): at 18:15

## 2021-08-08 RX ADMIN — Medication 975 MILLIGRAM(S): at 09:07

## 2021-08-08 RX ADMIN — Medication 1 TABLET(S): at 12:05

## 2021-08-08 RX ADMIN — Medication 600 MILLIGRAM(S): at 06:48

## 2021-08-08 RX ADMIN — Medication 975 MILLIGRAM(S): at 20:57

## 2021-08-08 RX ADMIN — Medication 600 MILLIGRAM(S): at 06:18

## 2021-08-08 RX ADMIN — Medication 975 MILLIGRAM(S): at 10:07

## 2021-08-08 RX ADMIN — Medication 975 MILLIGRAM(S): at 21:42

## 2021-08-08 RX ADMIN — Medication 600 MILLIGRAM(S): at 00:30

## 2021-08-08 RX ADMIN — Medication 600 MILLIGRAM(S): at 12:05

## 2021-08-08 RX ADMIN — Medication 600 MILLIGRAM(S): at 00:00

## 2021-08-08 RX ADMIN — Medication 600 MILLIGRAM(S): at 13:05

## 2021-08-08 NOTE — PROGRESS NOTE ADULT - ASSESSMENT
A/P:  MARTINE CRUZ is a 30y  now PPD#1 s/p spontaneous vaginal delivery at 40w gestation, uncomplicated.  -Vital signs stable  -Hgb: 13.7 -> AM labs pending   -Jehovahs witness. TXA1g intrapartum and 8 hours PPD given for ppx.   -Voiding, tolerating PO  -Advance care as tolerated   -Continue routine postpartum care and education  -Healthy male infant, desires circumcision  -Dispo: Patient to be discharged when meeting all postpartum milestones and pending attending approval.

## 2021-08-08 NOTE — PROGRESS NOTE ADULT - SUBJECTIVE AND OBJECTIVE BOX
MARTINE CRUZ is a 30y  now PPD#1 s/p spontaneous vaginal delivery at 40w gestation, uncomplicated.    S:    No acute events overnight.   The patient has no complaints.  Pain controlled with current treatment regimen.   She is ambulating without difficulty and tolerating PO.   + flatus/-BM/+ voiding   She endorses appropriate lochia, which is decreasing.   She is both bottle and breastfeeding. Would like to see lactation to discuss breastfeeding.    She denies fevers, chills, nausea and vomiting.   She denies lightheadedness, dizziness, palpitations, chest pain and SOB.     O:    T(C): 36.6 (21 @ 04:37), Max: 37.3 (21 @ 16:55)  HR: 55 (21 @ 04:37) (55 - 234)  BP: 92/58 (21 @ 04:37) (90/54 - 181/134)  RR: 18 (21 @ 04:37) (18 - 18)  SpO2: 97% (21 @ 04:37) (78% - 100%)    Gen: NAD, AOx3  CV: RRR, S1/S2 present  Pulm: CTAB  Abdomen:  Soft, non-tender, non-distended, +bowel sounds  Uterus:  Fundus firm below umbilicus  VE:  Expected lochia  Ext:  b/l LE non-tender                           13.7   16.17 )-----------( 239      ( 06 Aug 2021 20:00 )             41.8

## 2021-08-09 ENCOUNTER — APPOINTMENT (OUTPATIENT)
Dept: DISASTER EMERGENCY | Facility: CLINIC | Age: 31
End: 2021-08-09

## 2021-08-09 VITALS
DIASTOLIC BLOOD PRESSURE: 84 MMHG | HEART RATE: 86 BPM | SYSTOLIC BLOOD PRESSURE: 141 MMHG | OXYGEN SATURATION: 97 % | TEMPERATURE: 98 F | RESPIRATION RATE: 18 BRPM

## 2021-08-09 PROCEDURE — 85014 HEMATOCRIT: CPT

## 2021-08-09 PROCEDURE — 86901 BLOOD TYPING SEROLOGIC RH(D): CPT

## 2021-08-09 PROCEDURE — 59025 FETAL NON-STRESS TEST: CPT

## 2021-08-09 PROCEDURE — 86780 TREPONEMA PALLIDUM: CPT

## 2021-08-09 PROCEDURE — 85018 HEMOGLOBIN: CPT

## 2021-08-09 PROCEDURE — U0003: CPT

## 2021-08-09 PROCEDURE — 86850 RBC ANTIBODY SCREEN: CPT

## 2021-08-09 PROCEDURE — 86900 BLOOD TYPING SEROLOGIC ABO: CPT

## 2021-08-09 PROCEDURE — 81001 URINALYSIS AUTO W/SCOPE: CPT

## 2021-08-09 PROCEDURE — 86769 SARS-COV-2 COVID-19 ANTIBODY: CPT

## 2021-08-09 PROCEDURE — 36415 COLL VENOUS BLD VENIPUNCTURE: CPT

## 2021-08-09 PROCEDURE — 59050 FETAL MONITOR W/REPORT: CPT

## 2021-08-09 PROCEDURE — G0463: CPT

## 2021-08-09 PROCEDURE — 85025 COMPLETE CBC W/AUTO DIFF WBC: CPT

## 2021-08-09 PROCEDURE — U0005: CPT

## 2021-08-09 RX ADMIN — Medication 975 MILLIGRAM(S): at 03:35

## 2021-08-09 RX ADMIN — Medication 600 MILLIGRAM(S): at 05:31

## 2021-08-09 RX ADMIN — Medication 600 MILLIGRAM(S): at 12:05

## 2021-08-09 RX ADMIN — Medication 975 MILLIGRAM(S): at 02:50

## 2021-08-09 RX ADMIN — Medication 600 MILLIGRAM(S): at 00:46

## 2021-08-09 RX ADMIN — Medication 600 MILLIGRAM(S): at 00:01

## 2021-08-09 RX ADMIN — Medication 1 TABLET(S): at 12:05

## 2021-08-09 RX ADMIN — Medication 975 MILLIGRAM(S): at 09:12

## 2021-08-09 RX ADMIN — Medication 600 MILLIGRAM(S): at 06:16

## 2021-08-09 NOTE — CHART NOTE - NSCHARTNOTEFT_GEN_A_CORE
patient evaluated at bedside for shivering and chills.    She reports nausea, chills and shivering, however does not feel cold, denies subjective fevers.  Repots good night sleep, however upset for not being able to produce enough breastmilk.  States that is slightly anxious and scared    Physical exam:  Vital Signs Last 24 Hrs  T(C): 36.7 (09 Aug 2021 04:15), Max: 36.7 (09 Aug 2021 04:15)  T(F): 98 (09 Aug 2021 04:15), Max: 98 (09 Aug 2021 04:15)  HR: 61 (09 Aug 2021 04:15) (61 - 62)  BP: 120/81 (09 Aug 2021 04:15) (114/69 - 120/81)  RR: 16 (09 Aug 2021 04:15) (16 - 16)  SpO2: 97% (09 Aug 2021 04:15) (97% - 98%)  A&Ox4  Heart: rrr  Lungs: cta bl  Breasts: soft, nontender, non-engorged  Abdomen: soft, nontender, uterus below umbilicus  VE: minimal vaginal discharge  LE: bilateral nonpitting edema 2+, nontender, negative Nav sign    A/P: 30y0 [id number] postpartum day 2 after uncomplicated vaginal delivery with likely anxiety/panic attack.  -patient provided with a separate bathroom when she had an opportunity to refocus and breath. Feels better, understands that likely no health issue at this time    Will continue to monitor    Discussed with Dr. Moreno

## 2021-08-09 NOTE — PROGRESS NOTE ADULT - SUBJECTIVE AND OBJECTIVE BOX
MARTINE CRUZ is a 30y  now PPD#2 s/p spontaneous vaginal delivery at40 weeks gestation, uncomplicated.    S:    No acute events overnight.   The patient has no complaints.  Pain controlled with current treatment regimen.   She is ambulating without difficulty and tolerating PO.   + flatus/-BM/+ voiding   She endorses appropriate lochia, which is decreasing.   She is ttrying to breast feed and having difficulty.   She denies fevers, chills, nausea and vomiting.   She denies lightheadedness, dizziness, palpitations, chest pain and SOB.     O:    T(C): 36.7 (21 @ 04:15), Max: 36.7 (21 @ 04:15)  HR: 61 (21 @ 04:15) (61 - 62)  BP: 120/81 (21 @ 04:15) (114/69 - 120/81)  RR: 16 (21 @ 04:15) (16 - 16)  SpO2: 97% (21 @ 04:15) (97% - 98%)    Gen: NAD, AOx3  CV: RRR, S1/S2 present  Pulm: CTAB  Abdomen:  Soft, non-tender, non-distended, +bowel sounds  Uterus:  Fundus firm below umbilicus  VE:  Expected lochia  Ext:  Bilateral lower extremities non-tender and non-edematous                          10.4   x     )-----------( x        ( 08 Aug 2021 06:53 )             31.6

## 2021-08-09 NOTE — PROGRESS NOTE ADULT - ASSESSMENT
MARTINE CRUZ is a 30y  now PPD#2 s/p spontaneous vaginal delivery at40 weeks gestation, uncomplicated.      A/P:    -Vital signs stable  -Hgb: 13.7 ->  10.4   -Voiding, tolerating PO, bowel function nml   -Advance care as tolerated   -Continue routine postpartum care and education  - Have lactation come speak to patient   -Healthy male infant, circumcision done   - DVT ppx: Ambulation encouraged.   -Dispo: Anticipate discharge to home today pending attending approval.       MARTINE CURZ is a 30y  now PPD#2 s/p spontaneous vaginal delivery at40 weeks gestation, uncomplicated.      A/P:    -Vital signs stable  -Hgb: 13.7 ->  10.4   -Voiding, tolerating PO, bowel function nml   -Advance care as tolerated   -Continue routine postpartum care and education  -Have lactation come speak to patient   -Healthy male infant, circumcision done   -DVT ppx: Ambulation encouraged.   -Dispo: Anticipate discharge to home today pending attending approval.    PGY 4 Note: Patient seen and evaluated at bedside, note reviewed and edited as needed. Agree with the above.

## 2021-08-10 ENCOUNTER — APPOINTMENT (OUTPATIENT)
Dept: OBGYN | Facility: CLINIC | Age: 31
End: 2021-08-10
Payer: COMMERCIAL

## 2021-08-10 ENCOUNTER — NON-APPOINTMENT (OUTPATIENT)
Age: 31
End: 2021-08-10

## 2021-08-10 PROBLEM — L40.50 ARTHROPATHIC PSORIASIS, UNSPECIFIED: Chronic | Status: ACTIVE | Noted: 2021-08-06

## 2021-08-10 PROCEDURE — 99213 OFFICE O/P EST LOW 20 MIN: CPT | Mod: 24,95

## 2021-08-10 NOTE — DISCUSSION/SUMMARY
[FreeTextEntry1] : \par Reviewed her milk supply for first time deliveries can take up to 4-6 days to fully come in. She will have colostrum in the beginning and the more she latches the baby or pumps, the more it will help her milk supply. Prior to discharge from the hospital she met with the hospital lactation consultant and baby was not latching on well and needed a nipple shield. She reports raw nipples and she does not feel like herself thinking about continuing to breastfeed. Advised fed is best, if she needs to supplement while milk supple comes in, this is okay. Also reviewed if after taking Zofran and she still does not feel she can breastfeed, she should not feel obligated to breastfeed if not desired. Recommended seeing a breastfeeding lactation consultant if desire to breastfeed and wants help for proper latch and techniques to ease breastfeeding pain.\par \par Discussed postpartum nausea. Reviewed trying BRAT diet and starting with Zofran 4mg TID as needed to help her nausea. Advised Zofran may cause constipation. Increase water intake. \par \par Call parameters reviewed.\par \par Patient appreciative, concerns and questions addressed. Patient verbalized understanding.

## 2021-08-10 NOTE — HISTORY OF PRESENT ILLNESS
[FreeTextEntry1] : \par Patients name and date of birth were verified and verbal consent was obtained.\par \par She was informed of her ability to request an in office visit and that an in-office visit may be advised at the conclusion of this encounter.\par \par We discussed and provided via [facility] telehealth application will incorporate security protocols to protect PHI.  There is also the possibility of connection disruption during the visit.  Every effort will be made to re-establish a connection if disconnection occurs. However, there is a possibility the session may be terminated.  \par \par She was informed of telehealth services incur charges similar to an office visit.\par \par She provided verbal consent and wishes to proceed with the visit.\par \par __________________________\par \par Selma presents via telehealth for nausea after .\par \par She had an  on 21 of male baby. She reports coming home yesterday with baby after 4pm and since day 2 after delivery, has been very nausea and not been able to eat much.\par \par She also reports pain during breastfeeding and has had to start pumping and supplementing. She has been very anxious about breastfeeding and is unsure if she wants to continue.\par \par Reports good support team at home as her  and her mother are both home helping her with baby. \par \par Denies vomiting, blurry vision and epigastric pain.\par \par

## 2021-08-11 ENCOUNTER — APPOINTMENT (OUTPATIENT)
Dept: OBGYN | Facility: CLINIC | Age: 31
End: 2021-08-11

## 2021-08-13 ENCOUNTER — NON-APPOINTMENT (OUTPATIENT)
Age: 31
End: 2021-08-13

## 2021-08-13 ENCOUNTER — APPOINTMENT (OUTPATIENT)
Dept: OBGYN | Facility: CLINIC | Age: 31
End: 2021-08-13
Payer: COMMERCIAL

## 2021-08-13 VITALS
BODY MASS INDEX: 36.57 KG/M2 | DIASTOLIC BLOOD PRESSURE: 78 MMHG | HEIGHT: 67 IN | WEIGHT: 233 LBS | SYSTOLIC BLOOD PRESSURE: 122 MMHG

## 2021-08-13 DIAGNOSIS — B95.1 STREPTOCOCCUS, GROUP B, AS THE CAUSE OF DISEASES CLASSIFIED ELSEWHERE: ICD-10-CM

## 2021-08-13 DIAGNOSIS — O21.9 VOMITING OF PREGNANCY, UNSPECIFIED: ICD-10-CM

## 2021-08-13 DIAGNOSIS — O99.213 OBESITY COMPLICATING PREGNANCY, THIRD TRIMESTER: ICD-10-CM

## 2021-08-13 DIAGNOSIS — Z3A.38 38 WEEKS GESTATION OF PREGNANCY: ICD-10-CM

## 2021-08-13 PROCEDURE — 99213 OFFICE O/P EST LOW 20 MIN: CPT | Mod: 24

## 2021-08-14 PROBLEM — O99.213 OBESITY DURING PREGNANCY IN THIRD TRIMESTER: Status: RESOLVED | Noted: 2021-07-26 | Resolved: 2021-08-14

## 2021-08-14 PROBLEM — B95.1 POSITIVE GBS TEST: Status: RESOLVED | Noted: 2021-07-21 | Resolved: 2021-08-14

## 2021-08-14 PROBLEM — O21.9: Status: RESOLVED | Noted: 2021-08-10 | Resolved: 2021-08-14

## 2021-08-14 NOTE — PHYSICAL EXAM
[Examination Of The Breasts] : a normal appearance [Normal] : normal [Breast Abnormal Secretion Opalescent Fluid] : a milky discharge [No Masses] : no breast masses were palpable [Breast Abnormal Lactation (Galactorrhea)] : galactorrhea [Enlargement Of The Right Breast] : swelling [Tenderness Of The Right Breast] : tenderness [___] : a [unfilled] ~Ucm area of erythema [Enlargement Of The Left Breast] : swelling [Tenderness Of The Left Breast] : tenderness

## 2021-08-14 NOTE — END OF VISIT
[FreeTextEntry3] : I, [Dhaval Fernandez] solely acted as scribe for Dr. Qing Marques on 08/13/2021.\par All medical entries made by the Scribe were at my, Dr. Marques’s, direction and personally dictated\par by me on 08/13/2021. I have reviewed the chart and agree that the record accurately\par reflects my personal performance of the history, physical exam, assessment and plan. I have also\par personally directed, reviewed, and agreed with the chart.

## 2021-08-14 NOTE — DISCUSSION/SUMMARY
[FreeTextEntry1] : -Engorgement seen on breast exam with possible early mastitis of left breast. Rx for Dicloxacillin ordered today. Instructions given today.\par -Recommended to continue pumping every 2-3 hours. Instructions were given.\par -Recommend to try to latch the baby at the start at every feed.\par -Tylenol or Motrin as needed if engorgement symptoms continue.\par -RTO in 1 week for follow up or prn.

## 2021-08-14 NOTE — HISTORY OF PRESENT ILLNESS
[Patient reported PAP Smear was normal] : Patient reported PAP Smear was normal [N] : Patient does not use contraception [Y] : Positive pregnancy history [Menarche Age: ____] : age at menarche was [unfilled] [Previously active] : previously active [No] : No [TextBox_4] : Pt presents for mastitis [PapSmeardate] : 11/16/2020 [LMPDate] : 10/31/2020 [PGxTotal] : 1 [[facility]] : 1 [[facility] Center[facility]] : 1 [FreeTextEntry1] : 10/31/2020

## 2021-08-18 ENCOUNTER — NON-APPOINTMENT (OUTPATIENT)
Age: 31
End: 2021-08-18

## 2021-08-23 ENCOUNTER — APPOINTMENT (OUTPATIENT)
Dept: OBGYN | Facility: CLINIC | Age: 31
End: 2021-08-23

## 2021-08-30 ENCOUNTER — APPOINTMENT (OUTPATIENT)
Dept: OBGYN | Facility: CLINIC | Age: 31
End: 2021-08-30
Payer: COMMERCIAL

## 2021-08-30 ENCOUNTER — APPOINTMENT (OUTPATIENT)
Dept: OBGYN | Facility: CLINIC | Age: 31
End: 2021-08-30

## 2021-08-30 VITALS
DIASTOLIC BLOOD PRESSURE: 74 MMHG | HEIGHT: 67 IN | BODY MASS INDEX: 36.31 KG/M2 | WEIGHT: 231.38 LBS | SYSTOLIC BLOOD PRESSURE: 104 MMHG

## 2021-08-30 PROCEDURE — 99212 OFFICE O/P EST SF 10 MIN: CPT | Mod: 24

## 2021-08-30 NOTE — DISCUSSION/SUMMARY
[FreeTextEntry1] : \par Selma is interested in possibly starting the natural family planning method again which she had success with previously as contraceptive as birth control. \par She is scheduled for her 6 week PP visit on 09/17/21 with MD Newton.\par She was advised to call if any concerns arise prior to next appointment.

## 2021-09-02 NOTE — HISTORY OF PRESENT ILLNESS
[Postpartum Follow Up] : postpartum follow up [Last Pap Date: ___] : Last Pap Date: [unfilled] [Delivery Date: ___] : on [unfilled] [] : delivered by vaginal delivery [Male] : Delivery History: baby boy [Wt. ___] : weighing [unfilled] [Boy] : baby is a boy [Infant's Name ___] : [unfilled] [___ Lbs] : [unfilled] lbs [___ Oz] : [unfilled] oz [Circumcised] : circumcised [Living at Home] : is currently living at home [Bottle Feeding] : bottle feeding [Menarche Age: ____] : age at menarche was [unfilled] [Currently Active] : currently active [Men] : men [FreeTextEntry1] : \ish Hahn presents for followup visit.\par \par We are discussing and screening for Post Partum depression.  She is 3 weeks 2 days s/p  of baby boy.\par \par She denies symptoms of PP depression at this time, she is doing well and has good home support. She is pumping and supplementing with formula currently. \par \par She scored a 6 on the EPDS. She denies hx of PP Depression.\par \par She is unsure what she'd like to do for contraception at this time, she was previously using the natural family method until starting to try to conceive. \par \par She has her regularly scheduled PP visit on 21 with MD Newton.\par \par  [PapSmeardate] : 11/16/2020 [TextBox_31] : neg [Complications:___] : no complications [Breastfeeding] : not currently nursing [Resumed Menses] : has not resumed her menses [Resumed [place]] : has not resumed intercourse [Intended Contraception] : the patient does not intended to use contraception postpartum [de-identified] : PUMPING

## 2021-09-02 NOTE — HISTORY OF PRESENT ILLNESS
[Postpartum Follow Up] : postpartum follow up [Last Pap Date: ___] : Last Pap Date: [unfilled] [Delivery Date: ___] : on [unfilled] [] : delivered by vaginal delivery [Male] : Delivery History: baby boy [Wt. ___] : weighing [unfilled] [Boy] : baby is a boy [Infant's Name ___] : [unfilled] [___ Lbs] : [unfilled] lbs [___ Oz] : [unfilled] oz [Circumcised] : circumcised [Living at Home] : is currently living at home [Bottle Feeding] : bottle feeding [Menarche Age: ____] : age at menarche was [unfilled] [Currently Active] : currently active [Men] : men [FreeTextEntry1] : \ish Hahn presents for followup visit.\par \par We are discussing and screening for Post Partum depression.  She is 3 weeks 2 days s/p  of baby boy.\par \par She denies symptoms of PP depression at this time, she is doing well and has good home support. She is pumping and supplementing with formula currently. \par \par She scored a 6 on the EPDS. She denies hx of PP Depression.\par \par She is unsure what she'd like to do for contraception at this time, she was previously using the natural family method until starting to try to conceive. \par \par She has her regularly scheduled PP visit on 21 with MD Newton.\par \par  [PapSmeardate] : 11/16/2020 [TextBox_31] : neg [Complications:___] : no complications [Breastfeeding] : not currently nursing [Resumed Menses] : has not resumed her menses [Resumed [place]] : has not resumed intercourse [Intended Contraception] : the patient does not intended to use contraception postpartum [de-identified] : PUMPING

## 2021-09-09 ENCOUNTER — NON-APPOINTMENT (OUTPATIENT)
Age: 31
End: 2021-09-09

## 2021-09-17 ENCOUNTER — APPOINTMENT (OUTPATIENT)
Dept: OBGYN | Facility: CLINIC | Age: 31
End: 2021-09-17
Payer: COMMERCIAL

## 2021-09-17 ENCOUNTER — NON-APPOINTMENT (OUTPATIENT)
Age: 31
End: 2021-09-17

## 2021-09-17 VITALS
WEIGHT: 238 LBS | DIASTOLIC BLOOD PRESSURE: 84 MMHG | BODY MASS INDEX: 37.35 KG/M2 | HEIGHT: 67 IN | TEMPERATURE: 97.5 F | SYSTOLIC BLOOD PRESSURE: 128 MMHG

## 2021-09-17 PROCEDURE — 0503F POSTPARTUM CARE VISIT: CPT

## 2021-09-17 NOTE — ATTENDING NOTE
[FreeTextEntry3] : 31 yo  presents for a postpartum visit. She is s/p  on 21 to a baby boy Catarino. 8lb1oz \par \par Labs: O positive/RI. . \par Last pap: 2020 neg hpv, neg pap\par Discharge hg 10.4; hx of familial [place] fever\par \par #Postpartum\par -She is breast pumping and bottle feeding.\par -Normal breast exam w/ some nipple pinkness; recent re-fitting for pump flange; using nipple butter; no sign of infection; previously treated for mastitis 5 wks ago\par She plans natural family planning; aware of pregnancy latency recommendations\par Denies depression/blues. EPDS 5\par \par #Hx of Mastitis 1 week PP. \par -Was treated w/ Abx.\par  -no sx now\par -nipple care discussed\par RTO in 6 months for gyn annual\par \par

## 2021-09-17 NOTE — HISTORY OF PRESENT ILLNESS
[Postpartum Follow Up] : postpartum follow up [Last Pap Date: ___] : Last Pap Date: [unfilled] [Delivery Date: ___] : on [unfilled] [Male] : Delivery History: baby boy [Wt. ___] : weighing [unfilled] [Breastfeeding] : currently nursing [] : delivered by vaginal delivery [Back to Normal] : is back to normal in size [None] : no vaginal bleeding [Examination Of The Breasts] : breasts are normal [Doing Well] : is doing well [Complications:___] : no complications [Discharge HGB: ___] : hemoglobin level was [unfilled] [Resumed Menses] : has not resumed her menses [Resumed [place]] : has not resumed intercourse [Intended Contraception] : Intended Contraception: [Natural Family Planning] : natural family planning [Awake] : awake [Alert] : alert [Acute Distress] : no acute distress [LAD] : no lymphadenopathy [Thyroid Nodule] : no thyroid nodule [Goiter] : no goiter [Mass] : no breast mass [Nipple Discharge] : no nipple discharge [Axillary LAD] : no axillary lymphadenopathy [Soft] : soft [Tender] : non tender [Distended] : not distended [Oriented x3] : oriented to person, place, and time [Normal] : uterus [Discharge] : no discharge [No Bleeding] : there was no active vaginal bleeding [FreeTextEntry8] : s/p  21. Baby Boy Catarino. 8lb1oz. [FreeTextEntry7] : involuted uterus

## 2021-09-17 NOTE — END OF VISIT
[FreeTextEntry3] : I, Yaneth Bruce, solely acted as a scribe for Dr. Tracy Newton on 09/17/2021. All medical entries made by the Scribe were at my, Dr. Newton's, direction and personally dictated by me on 09/17/2021. I have reviewed the chart and agree that the record accurately reflects my personal performance of the history, physical exam, assessment and plan. I have also personally directed, reviewed and agreed with the chart.

## 2021-11-15 ENCOUNTER — NON-APPOINTMENT (OUTPATIENT)
Age: 31
End: 2021-11-15

## 2021-11-19 ENCOUNTER — APPOINTMENT (OUTPATIENT)
Dept: FAMILY MEDICINE | Facility: CLINIC | Age: 31
End: 2021-11-19
Payer: COMMERCIAL

## 2021-11-19 VITALS
DIASTOLIC BLOOD PRESSURE: 92 MMHG | TEMPERATURE: 97.4 F | SYSTOLIC BLOOD PRESSURE: 132 MMHG | WEIGHT: 237 LBS | HEIGHT: 67 IN | OXYGEN SATURATION: 99 % | HEART RATE: 64 BPM | RESPIRATION RATE: 16 BRPM | BODY MASS INDEX: 37.2 KG/M2

## 2021-11-19 DIAGNOSIS — Z30.09 ENCOUNTER FOR OTHER GENERAL COUNSELING AND ADVICE ON CONTRACEPTION: ICD-10-CM

## 2021-11-19 DIAGNOSIS — Z13.32 ENCOUNTER FOR SCREENING FOR MATERNAL DEPRESSION: ICD-10-CM

## 2021-11-19 DIAGNOSIS — Z87.898 PERSONAL HISTORY OF OTHER SPECIFIED CONDITIONS: ICD-10-CM

## 2021-11-19 DIAGNOSIS — Z01.818 ENCOUNTER FOR OTHER PREPROCEDURAL EXAMINATION: ICD-10-CM

## 2021-11-19 DIAGNOSIS — Z53.1 PROCEDURE AND TREATMENT NOT CARRIED OUT BECAUSE OF PATIENT'S DECISION FOR REASONS OF BELIEF AND GROUP PRESSURE: ICD-10-CM

## 2021-11-19 PROCEDURE — 99214 OFFICE O/P EST MOD 30 MIN: CPT

## 2021-11-19 RX ORDER — CLOBETASOL PROPIONATE 0.5 MG/G
0.05 CREAM TOPICAL TWICE DAILY
Qty: 1 | Refills: 1 | Status: ACTIVE | COMMUNITY
Start: 2021-11-19 | End: 1900-01-01

## 2021-11-24 ENCOUNTER — NON-APPOINTMENT (OUTPATIENT)
Age: 31
End: 2021-11-24

## 2021-11-24 ENCOUNTER — APPOINTMENT (OUTPATIENT)
Dept: RHEUMATOLOGY | Facility: CLINIC | Age: 31
End: 2021-11-24
Payer: COMMERCIAL

## 2021-11-24 ENCOUNTER — TRANSCRIPTION ENCOUNTER (OUTPATIENT)
Age: 31
End: 2021-11-24

## 2021-11-24 VITALS
BODY MASS INDEX: 36.88 KG/M2 | RESPIRATION RATE: 18 BRPM | DIASTOLIC BLOOD PRESSURE: 69 MMHG | TEMPERATURE: 97.8 F | OXYGEN SATURATION: 100 % | HEART RATE: 60 BPM | WEIGHT: 235 LBS | SYSTOLIC BLOOD PRESSURE: 105 MMHG | HEIGHT: 67 IN

## 2021-11-24 PROCEDURE — 99205 OFFICE O/P NEW HI 60 MIN: CPT | Mod: 25

## 2021-11-24 PROCEDURE — 36415 COLL VENOUS BLD VENIPUNCTURE: CPT

## 2021-11-24 NOTE — ASSESSMENT
[FreeTextEntry1] : 30 y/o female referred to rheumatology for treatment of PsA.\par Psoriatic arthritis was diagnosed 2 years ago by Dr. Eitan Moreira in setting of inflammatory joint pains and psoriasis. Denies uveitis, dactylitis. Pt was on Otezla but stopped when she became pregnant. Pt did not have side effects with Otezla with improvement of her psoriasis and joint pains.\par Pt has now given birth and now has flares of joint pains (hands, knees, hips, ankles) and psoriasis. Pt does not plan to have children in the future. Pt has stopped breastfeeding.\par \par Pt had Hx of FMF and treated with colchicine but stopped after rhabdomyolysis. Pt would have recurrent high fever, joint pains, abdominal pain every ~3 months. Pt has not had these symptoms since pregnancy.\par Mother and Father diagnosed with RA. Patient and father have FMF.\par \par Patient clinically has psoriatic arthritis in setting of inflammatory arthritis and psoriasis, which has been in remission during pregnancy, now returning after birth.\par Pt has Hx of FMF but no active symptoms. Will monitor for recurrence and signs of amyloidosis.\par \par - Obtain bloodwork to evaluate disease activity and medication safety\par - Obtain XR b/l hands, knees, and SI joint\par - Depending on above workup (iván SI joint for any axial involvement), will likely start Otezla with starter pack -> 30mg PO BID for psoriatic arthritis\par - Will call pt to start Otezla depending on results. RTC in 2 months to monitor disease activity on medication.

## 2021-11-24 NOTE — HISTORY OF PRESENT ILLNESS
[FreeTextEntry1] : 32 y/o female referred to rheumatology for evaluation of psoriatic arthritis \par Pt was diagnosed with psoriatic arthritis 2 years ago by Dr. Eitan Moreira, diagnosed via inflammatory joint pains and psoriasis. Denies uveitis, dactylitis. Pt was on Otezla but stopped when she became pregnant. Pt did not have side effects with Otezla with improvement of her psoriasis and joint pains.\par Pt has now given birth and now has flares of joint pains (hands, knees, hips, ankles) and psoriasis. Pt does not plan to have children in the future. Pt has stopped breastfeeding.\par Pt had Hx of FMF and treated with colchicine but stopped after rhabdomyolysis. Pt would have recurrent high fever, joint pains, abdominal pain every ~3 months. Pt has not had these symptoms since pregnancy.\par Pt currently denies any other symptoms.\par Mother and Father diagnosed with RA. Patient and father have FMF.\par \par \par

## 2021-11-25 LAB
ALBUMIN SERPL ELPH-MCNC: 4.7 G/DL
ALP BLD-CCNC: 104 U/L
ALT SERPL-CCNC: 10 U/L
ANION GAP SERPL CALC-SCNC: 13 MMOL/L
APPEARANCE: CLEAR
AST SERPL-CCNC: 15 U/L
BASOPHILS # BLD AUTO: 0.02 K/UL
BASOPHILS NFR BLD AUTO: 0.2 %
BILIRUB SERPL-MCNC: 0.2 MG/DL
BILIRUBIN URINE: NEGATIVE
BLOOD URINE: NEGATIVE
BUN SERPL-MCNC: 11 MG/DL
CALCIUM SERPL-MCNC: 10.1 MG/DL
CCP AB SER IA-ACNC: <8 UNITS
CHLORIDE SERPL-SCNC: 101 MMOL/L
CO2 SERPL-SCNC: 27 MMOL/L
COLOR: YELLOW
CREAT SERPL-MCNC: 0.81 MG/DL
CREAT SPEC-SCNC: 107 MG/DL
CREAT/PROT UR: 0.1 RATIO
CRP SERPL-MCNC: 6 MG/L
EOSINOPHIL # BLD AUTO: 0.08 K/UL
EOSINOPHIL NFR BLD AUTO: 1 %
ERYTHROCYTE [SEDIMENTATION RATE] IN BLOOD BY WESTERGREN METHOD: 19 MM/HR
GLUCOSE QUALITATIVE U: NEGATIVE
GLUCOSE SERPL-MCNC: 83 MG/DL
HBV CORE IGG+IGM SER QL: NONREACTIVE
HBV SURFACE AB SER QL: REACTIVE
HBV SURFACE AG SER QL: NONREACTIVE
HCT VFR BLD CALC: 41.1 %
HCV AB SER QL: NONREACTIVE
HCV S/CO RATIO: 0.21 S/CO
HGB BLD-MCNC: 12.9 G/DL
IMM GRANULOCYTES NFR BLD AUTO: 0.1 %
KETONES URINE: NEGATIVE
LEUKOCYTE ESTERASE URINE: NEGATIVE
LYMPHOCYTES # BLD AUTO: 2.86 K/UL
LYMPHOCYTES NFR BLD AUTO: 34.1 %
MAN DIFF?: NORMAL
MCHC RBC-ENTMCNC: 29.6 PG
MCHC RBC-ENTMCNC: 31.4 GM/DL
MCV RBC AUTO: 94.3 FL
MONOCYTES # BLD AUTO: 0.66 K/UL
MONOCYTES NFR BLD AUTO: 7.9 %
NEUTROPHILS # BLD AUTO: 4.75 K/UL
NEUTROPHILS NFR BLD AUTO: 56.7 %
NITRITE URINE: NEGATIVE
PH URINE: 6.5
PLATELET # BLD AUTO: 276 K/UL
POTASSIUM SERPL-SCNC: 4.4 MMOL/L
PROT SERPL-MCNC: 7.7 G/DL
PROT UR-MCNC: 13 MG/DL
PROTEIN URINE: NEGATIVE
RBC # BLD: 4.36 M/UL
RBC # FLD: 12.4 %
RF+CCP IGG SER-IMP: NEGATIVE
RHEUMATOID FACT SER QL: <10 IU/ML
SODIUM SERPL-SCNC: 141 MMOL/L
SPECIFIC GRAVITY URINE: 1.02
THYROGLOB AB SERPL-ACNC: <20 IU/ML
THYROPEROXIDASE AB SERPL IA-ACNC: <10 IU/ML
UROBILINOGEN URINE: NORMAL
WBC # FLD AUTO: 8.38 K/UL

## 2021-11-26 PROBLEM — Z87.898 HISTORY OF ACUTE MASTITIS: Status: RESOLVED | Noted: 2021-08-13 | Resolved: 2021-11-26

## 2021-11-26 PROBLEM — Z53.1 REFUSAL OF BLOOD TRANSFUSIONS AS PATIENT IS JEHOVAH'S WITNESS: Status: RESOLVED | Noted: 2021-07-26 | Resolved: 2021-11-26

## 2021-11-26 PROBLEM — Z30.09 ENCOUNTER FOR GENERAL COUNSELING AND ADVICE ON CONTRACEPTIVE MANAGEMENT: Status: RESOLVED | Noted: 2021-08-30 | Resolved: 2021-11-26

## 2021-11-26 PROBLEM — Z13.32 ENCOUNTER FOR SCREENING FOR MATERNAL DEPRESSION: Status: RESOLVED | Noted: 2021-08-30 | Resolved: 2021-11-26

## 2021-11-26 PROBLEM — Z01.818 PREOP TESTING: Status: RESOLVED | Noted: 2021-08-08 | Resolved: 2021-11-26

## 2021-11-26 NOTE — PHYSICAL EXAM
[No Acute Distress] : no acute distress [Well-Appearing] : well-appearing [Normal Sclera/Conjunctiva] : normal sclera/conjunctiva [No JVD] : no jugular venous distention [No Respiratory Distress] : no respiratory distress  [Clear to Auscultation] : lungs were clear to auscultation bilaterally [Regular Rhythm] : with a regular rhythm [Normal S1, S2] : normal S1 and S2 [No Focal Deficits] : no focal deficits [Speech Grossly Normal] : speech grossly normal [Alert and Oriented x3] : oriented to person, place, and time [de-identified] : calm and engaging  [de-identified] : DYLAN [de-identified] : mild psoriatic patches hairline and behind ear

## 2021-11-26 NOTE — HISTORY OF PRESENT ILLNESS
[FreeTextEntry1] : the pt is here to evaluated for her psoriatic arthritis and low breast milk production  [de-identified] : Last seen in  2021 to establish care and encounter reviewed.\par SInce has delivered FT healthy baby boy  21;  enjoying motherhood.\par Struggles however with  breast feeding and needs to supplement.\par \par Psoriasis is again evident and will be following with rheumatology in consideration of medication management. 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

## 2021-11-26 NOTE — ASSESSMENT
[FreeTextEntry1] : Referral provided for Rheumatology as requested.\par Clobetasol prescribed as well.  \par \par Best wishes offered!

## 2021-11-27 LAB
ENA SS-A AB SER IA-ACNC: <0.2 AL
ENA SS-B AB SER IA-ACNC: <0.2 AL

## 2021-11-28 LAB
M TB IFN-G BLD-IMP: NEGATIVE
QUANTIFERON TB PLUS MITOGEN MINUS NIL: 8.67 IU/ML
QUANTIFERON TB PLUS NIL: 0.01 IU/ML
QUANTIFERON TB PLUS TB1 MINUS NIL: 0 IU/ML
QUANTIFERON TB PLUS TB2 MINUS NIL: 0 IU/ML

## 2021-11-29 LAB
ANA PAT FLD IF-IMP: ABNORMAL
ANA SER IF-ACNC: ABNORMAL

## 2021-12-21 ENCOUNTER — NON-APPOINTMENT (OUTPATIENT)
Age: 31
End: 2021-12-21

## 2021-12-27 ENCOUNTER — TRANSCRIPTION ENCOUNTER (OUTPATIENT)
Age: 31
End: 2021-12-27

## 2022-01-11 ENCOUNTER — NON-APPOINTMENT (OUTPATIENT)
Age: 32
End: 2022-01-11

## 2022-01-13 ENCOUNTER — APPOINTMENT (OUTPATIENT)
Dept: FAMILY MEDICINE | Facility: CLINIC | Age: 32
End: 2022-01-13

## 2022-01-24 ENCOUNTER — APPOINTMENT (OUTPATIENT)
Dept: RHEUMATOLOGY | Facility: CLINIC | Age: 32
End: 2022-01-24
Payer: COMMERCIAL

## 2022-01-24 VITALS
SYSTOLIC BLOOD PRESSURE: 134 MMHG | DIASTOLIC BLOOD PRESSURE: 82 MMHG | OXYGEN SATURATION: 100 % | HEIGHT: 67 IN | HEART RATE: 80 BPM | WEIGHT: 230 LBS | BODY MASS INDEX: 36.1 KG/M2 | TEMPERATURE: 98.4 F

## 2022-01-24 PROCEDURE — 99215 OFFICE O/P EST HI 40 MIN: CPT | Mod: 25

## 2022-01-24 PROCEDURE — 36415 COLL VENOUS BLD VENIPUNCTURE: CPT

## 2022-01-24 RX ORDER — APREMILAST 10-20-30MG
10 & 20 & 30 KIT ORAL
Qty: 1 | Refills: 0 | Status: COMPLETED | COMMUNITY
Start: 2021-11-30 | End: 2022-01-24

## 2022-01-24 RX ORDER — APREMILAST 30 MG/1
30 TABLET, FILM COATED ORAL
Qty: 60 | Refills: 2 | Status: COMPLETED | COMMUNITY
Start: 2021-11-30 | End: 2022-01-24

## 2022-01-24 NOTE — HISTORY OF PRESENT ILLNESS
[FreeTextEntry1] : HISTORY: \par 30 y/o female presents as follow up for treatment of PsA. \par Psoriatic arthritis was diagnosed 2 years ago by Dr. Eitan Kumari in setting of inflammatory joint pains and psoriasis. Denies uveitis, dactylitis. Pt was on Otezla but stopped when she became pregnant. Pt did not have side effects with Otezla with improvement of her psoriasis and joint pains. \par Pt has now given birth and now has flares of joint pains (hands, knees, hips, ankles) and psoriasis. Pt does not plan to have children in the future. Pt has stopped breastfeeding. \par Pt had Hx of FMF and treated with colchicine but stopped after rhabdomyolysis. Pt would have recurrent high fever, joint pains, abdominal pain every ~3 months. Pt has not had these symptoms since pregnancy. \par Mother and Father diagnosed with RA. Patient and father have FMF. \par \par INTERVAL HISTORY: \par Workup showed JONO 1:80 and elevated inflammatory markers. Patient was restarted on Otezla but had to stop due to side effects of severe nausea, abdominal pain and lightheadedness. Pt states that she did have a fever and may have been a flare of FMF. Pt’s symptoms have improved off Otezla.\par Pt reports improvement of psoriasis and joint pains while on Otezla, but starting to have some joint pains and psoriasis back since last week, but not severe.\par \par WORKUP: \par Remarkable for (11/2021): JONO 1:80 speckled, CRP 6 (nml <=4), ESR 19 \par Normal/neg (11/2021): CBC, CMP, U/A, UPCR, thyroid Ab, SSA, SSB, RF, CCP, Hep B/C panel, Quantiferon TB \par XR b/l hands (12/2021): Normal \par XR b/l knees (12/2021): Normal \par XR SI joint (12/2021): Normal\par

## 2022-01-24 NOTE — ASSESSMENT
[FreeTextEntry1] : 32 y/o female presents as follow up for treatment of PsA. \par Psoriatic arthritis was diagnosed 2 years ago by Dr. Eitan Kumari in setting of inflammatory joint pains and psoriasis. Denies uveitis, dactylitis. Pt was on Otezla but stopped when she became pregnant. Pt did not have side effects with Otezla with improvement of her psoriasis and joint pains. \par Pt has now given birth and now has flares of joint pains (hands, knees, hips, ankles) and psoriasis. Pt does not plan to have children in the future. Pt has stopped breastfeeding. \par Pt had Hx of FMF and treated with colchicine but stopped after rhabdomyolysis. Pt would have recurrent high fever, joint pains, abdominal pain every ~3 months. Pt has not had these symptoms since pregnancy. \par Mother and Father diagnosed with RA. Patient and father have FMF. \par \par Patient clinically has psoriatic arthritis in setting of inflammatory arthritis and psoriasis, which has been in remission during pregnancy, now returning after birth. Pt was restarted on Otezla in 11/2022 but stopped due to GI side effects (possibly FMF flare).\par Pt has Hx of FMF but infrequent symptoms.\par \par Patient's psoriatic arthritis symptoms are mild with minimally elevated inflammatory markers. XR do not show any inflammatory changes. Patient would like to minimize medications especially since her symptoms are mild. I believe it is reasonable to treat PsA conservatively and treat flares with NSAIDs while watching for signs of more severe involvement to start treatment with DMARDs such as MTX.\par Pt's FMF may cause more severe complications such as amyloidosis, and given that pt is having symptoms, I would like her to be treated. Pt had rhabdomyolysis on ?BID dosing of colchicine after being on the medication for many months. Will try a much reduced dose of colchicine and will stop medication right away for any signs of muscle pain/weakness, CPK elevation.\par \par - Obtain labwork to evaluate minimally positive JONO, inflammatory markers, CPK\par - Start colchicine 0.6mg every other day. Patient to stop for any signs of muscle pain/weakness and I will obtain CPK level\par - No chronic medicaiton for PsA for now. If symptoms of PsA worsening, will consider starting MTX, which patient is aware of\par - RTC 2 months for follow up\par \par

## 2022-01-25 LAB
ALBUMIN SERPL ELPH-MCNC: 4.9 G/DL
ALP BLD-CCNC: 97 U/L
ALT SERPL-CCNC: 14 U/L
ANION GAP SERPL CALC-SCNC: 15 MMOL/L
AST SERPL-CCNC: 17 U/L
BASOPHILS # BLD AUTO: 0.03 K/UL
BASOPHILS NFR BLD AUTO: 0.3 %
BILIRUB SERPL-MCNC: 0.2 MG/DL
BUN SERPL-MCNC: 9 MG/DL
C3 SERPL-MCNC: 152 MG/DL
C4 SERPL-MCNC: 31 MG/DL
CALCIUM SERPL-MCNC: 10.1 MG/DL
CHLORIDE SERPL-SCNC: 101 MMOL/L
CK SERPL-CCNC: 136 U/L
CO2 SERPL-SCNC: 25 MMOL/L
CREAT SERPL-MCNC: 0.73 MG/DL
CRP SERPL-MCNC: 8 MG/L
EOSINOPHIL # BLD AUTO: 0.07 K/UL
EOSINOPHIL NFR BLD AUTO: 0.7 %
ERYTHROCYTE [SEDIMENTATION RATE] IN BLOOD BY WESTERGREN METHOD: 26 MM/HR
GLUCOSE SERPL-MCNC: 109 MG/DL
HCT VFR BLD CALC: 41.3 %
HGB BLD-MCNC: 13.5 G/DL
IMM GRANULOCYTES NFR BLD AUTO: 0.3 %
LYMPHOCYTES # BLD AUTO: 2.77 K/UL
LYMPHOCYTES NFR BLD AUTO: 26.5 %
MAN DIFF?: NORMAL
MCHC RBC-ENTMCNC: 29.1 PG
MCHC RBC-ENTMCNC: 32.7 GM/DL
MCV RBC AUTO: 89 FL
MONOCYTES # BLD AUTO: 0.56 K/UL
MONOCYTES NFR BLD AUTO: 5.3 %
NEUTROPHILS # BLD AUTO: 7.01 K/UL
NEUTROPHILS NFR BLD AUTO: 66.9 %
PLATELET # BLD AUTO: 341 K/UL
POTASSIUM SERPL-SCNC: 4.1 MMOL/L
PROT SERPL-MCNC: 8 G/DL
RBC # BLD: 4.64 M/UL
RBC # FLD: 12.5 %
SODIUM SERPL-SCNC: 141 MMOL/L
THYROGLOB AB SERPL-ACNC: <20 IU/ML
THYROPEROXIDASE AB SERPL IA-ACNC: <10 IU/ML
WBC # FLD AUTO: 10.47 K/UL

## 2022-01-26 LAB
ENA RNP AB SER IA-ACNC: 0.3 AL
ENA SM AB SER IA-ACNC: <0.2 AL
ENA SS-A AB SER IA-ACNC: <0.2 AL
ENA SS-B AB SER IA-ACNC: <0.2 AL

## 2022-01-27 LAB — DSDNA AB SER-ACNC: <12 IU/ML

## 2022-03-03 ENCOUNTER — APPOINTMENT (OUTPATIENT)
Dept: OBGYN | Facility: CLINIC | Age: 32
End: 2022-03-03
Payer: COMMERCIAL

## 2022-03-03 VITALS
BODY MASS INDEX: 37.37 KG/M2 | TEMPERATURE: 97.4 F | WEIGHT: 238.13 LBS | HEIGHT: 67 IN | SYSTOLIC BLOOD PRESSURE: 118 MMHG | DIASTOLIC BLOOD PRESSURE: 64 MMHG

## 2022-03-03 DIAGNOSIS — Z30.09 ENCOUNTER FOR OTHER GENERAL COUNSELING AND ADVICE ON CONTRACEPTION: ICD-10-CM

## 2022-03-03 PROCEDURE — 99395 PREV VISIT EST AGE 18-39: CPT

## 2022-03-04 PROBLEM — Z30.09 BIRTH CONTROL COUNSELING: Status: ACTIVE | Noted: 2022-03-04

## 2022-03-04 NOTE — PHYSICAL EXAM
[Chaperone Present] : A chaperone was present in the examining room during all aspects of the physical examination [FreeTextEntry1] : REID [Appropriately responsive] : appropriately responsive [Alert] : alert [No Acute Distress] : no acute distress [No Lymphadenopathy] : no lymphadenopathy [Soft] : soft [Non-tender] : non-tender [Non-distended] : non-distended [Oriented x3] : oriented x3 [Examination Of The Breasts] : a normal appearance [No Discharge] : no discharge [No Masses] : no breast masses were palpable [Labia Majora] : normal [Labia Minora] : normal [No Bleeding] : There was no active vaginal bleeding [Normal] : normal [Uterine Adnexae] : non-palpable

## 2022-03-04 NOTE — HISTORY OF PRESENT ILLNESS
[HPV test offered] : HPV test offered [N] : Patient reports normal menses [Monogamous (Male Partner)] : is monogamous with a male partner [Y] : Positive pregnancy history [Menarche Age: ____] : age at menarche was [unfilled] [No] : Patient does not have concerns regarding sex [Currently Active] : currently active [Condoms] : uses condoms [PapSmeardate] : 11/16/2020 [TextBox_31] : Negative [HPVDate] : 11/16/2020 [TextBox_78] : Negative [LMPDate] : 02/22/2022 [PGxTotal] : 1 [[facility]] : [facility] [[facility]] : 1 [FreeTextEntry1] : 02/22/2022

## 2022-03-04 NOTE — DISCUSSION/SUMMARY
[FreeTextEntry1] :   The benefits of adequate calcium intake and a daily multivitamin along with routine daily cardiovascular exercise were reviewed with the patient.\par We reviewed all forms of contraception. She is currently happy using condoms but is considering an IUD. We discussed the difference between the copper vs LNG IUD. \par   The importance of safer-sex was discussed with the patient.\par We reviewed ASCCP/ACOG guidelines for pap smears.

## 2022-03-06 LAB — HPV HIGH+LOW RISK DNA PNL CVX: NOT DETECTED

## 2022-03-10 ENCOUNTER — TRANSCRIPTION ENCOUNTER (OUTPATIENT)
Age: 32
End: 2022-03-10

## 2022-03-11 LAB — CYTOLOGY CVX/VAG DOC THIN PREP: NORMAL

## 2022-03-24 ENCOUNTER — APPOINTMENT (OUTPATIENT)
Dept: RHEUMATOLOGY | Facility: CLINIC | Age: 32
End: 2022-03-24
Payer: COMMERCIAL

## 2022-03-24 VITALS
DIASTOLIC BLOOD PRESSURE: 82 MMHG | BODY MASS INDEX: 36.1 KG/M2 | HEART RATE: 68 BPM | OXYGEN SATURATION: 97 % | SYSTOLIC BLOOD PRESSURE: 125 MMHG | TEMPERATURE: 97.2 F | HEIGHT: 67 IN | WEIGHT: 230 LBS

## 2022-03-24 PROCEDURE — 36415 COLL VENOUS BLD VENIPUNCTURE: CPT

## 2022-03-24 PROCEDURE — 99214 OFFICE O/P EST MOD 30 MIN: CPT | Mod: 25

## 2022-03-24 NOTE — ASSESSMENT
[FreeTextEntry1] : 30 y/o female presents as follow up for treatment of PsA and FMF. \par Psoriatic arthritis was diagnosed ~2020 by Dr. Eitan Kumari in setting of inflammatory joint pains and psoriasis. Denies uveitis, dactylitis. Pt was on Otezla but stopped when she became pregnant. Pt did not have side effects with Otezla with improvement of her psoriasis and joint pains.  \par Pt has now given birth and now has flares of joint pains (hands, knees, hips, ankles) and psoriasis. Pt does not plan to have children in the future. Pt has stopped breastfeeding.  \par Pt had Hx of FMF and treated with colchicine but stopped after rhabdomyolysis. Pt would have recurrent high fever, joint pains, abdominal pain every ~3 months. Pt has not had these symptoms since pregnancy.  \par Mother and Father diagnosed with RA. Patient and father have FMF.  \par \par Workup by me with minimal positive JONO with negative JONO subserologies. Mildly elevated inflammatory markers. XR without inflammatory/erosive changes.\par Patient clinically has psoriatic arthritis in setting of inflammatory arthritis and psoriasis, which has been in remission during pregnancy, now returning after birth. Pt was restarted on Otezla in 11/2022 but stopped due to GI side effects (possibly FMF flare). Given minimal symptoms of PsA, patient is currently being treated on PRN based with NSAIDs at this time.\par However, given pt is having not infrequent flares of FMF with risk of more severe complications as amyloidosis, patient was restarted on colchicine every other day (due to Hx of rhabdomyolysis on ?BID dosing of colchicine after being on the medication for many months). So far pt has not had any muscle weakness or pain.\par Pt to be worked up by GI for recurrent nausea, not apparently associated temporally with colchicine.\par \par - Repeat inflammatory markers and muscle enzymes today\par - c/w colchicine 0.6mg every other day. Patient to stop for any signs of muscle pain/weakness and I will obtain CPK level \par - No chronic medication for PsA for now. If symptoms of PsA worsening, will consider starting MTX, which patient is aware of \par - RTC 3 months or earlier if needed for follow up\par

## 2022-03-24 NOTE — HISTORY OF PRESENT ILLNESS
[FreeTextEntry1] : HISTORY: \par 32 y/o female presents as follow up for treatment of PsA and FMF. \par Psoriatic arthritis was diagnosed ~2020 by Dr. Eitan Kumari in setting of inflammatory joint pains and psoriasis. Denies uveitis, dactylitis. Pt was on Otezla but stopped when she became pregnant. Pt did not have side effects with Otezla with improvement of her psoriasis and joint pains.  \par Pt has now given birth and now has flares of joint pains (hands, knees, hips, ankles) and psoriasis. Pt does not plan to have children in the future. Pt has stopped breastfeeding.  \par Pt had Hx of FMF and treated with colchicine but stopped after rhabdomyolysis. Pt would have recurrent high fever, joint pains, abdominal pain every ~3 months. Pt has not had these symptoms since pregnancy.  \par Mother and Father diagnosed with RA. Patient and father have FMF.  \par \par Patient clinically has psoriatic arthritis in setting of inflammatory arthritis and psoriasis, which has been in remission during pregnancy, now returning after birth. Pt was restarted on Otezla in 11/2022 but stopped due to GI side effects (possibly FMF flare). \par Pt has Hx of FMF but infrequent symptoms. \par \par INTERVAL HISTORY: \par Pt has been having increased recurrent nausea, pt does not feel that it was temporary related to colchicine days, which she has been taking every other day. Pt has not found any specific triggers so far. Pt has follow up with GI. Pt has been taking Zofran PRN. Otherwise, pt has not had any FMF flares.\par \par WORKUP:  \par Remarkable for (11/2021 - 1/2022): JONO 1:80 speckled, CRP 8 (nml <=4), ESR 26  \par Normal/neg (11/2021 - 1/2022): CBC, CMP, U/A, UPCR, dsDNA, SSA, SSB, , RNP, C3, C4, thyroid Ab, SSA, SSB, RF, CCP, Hep B/C panel, Quantiferon TB  \par XR b/l hands (12/2021): Normal  \par XR b/l knees (12/2021): Normal  \par XR SI joint (12/2021): Normal

## 2022-03-25 LAB
ALBUMIN SERPL ELPH-MCNC: 4.6 G/DL
ALP BLD-CCNC: 85 U/L
ALT SERPL-CCNC: 36 U/L
ANION GAP SERPL CALC-SCNC: 12 MMOL/L
AST SERPL-CCNC: 25 U/L
BASOPHILS # BLD AUTO: 0.03 K/UL
BASOPHILS NFR BLD AUTO: 0.3 %
BILIRUB SERPL-MCNC: 0.2 MG/DL
BUN SERPL-MCNC: 14 MG/DL
CALCIUM SERPL-MCNC: 9.6 MG/DL
CHLORIDE SERPL-SCNC: 103 MMOL/L
CK SERPL-CCNC: 80 U/L
CO2 SERPL-SCNC: 24 MMOL/L
CREAT SERPL-MCNC: 0.77 MG/DL
CRP SERPL-MCNC: 5 MG/L
EGFR: 106 ML/MIN/1.73M2
EOSINOPHIL # BLD AUTO: 0.05 K/UL
EOSINOPHIL NFR BLD AUTO: 0.4 %
ERYTHROCYTE [SEDIMENTATION RATE] IN BLOOD BY WESTERGREN METHOD: 14 MM/HR
GLUCOSE SERPL-MCNC: 92 MG/DL
HCT VFR BLD CALC: 41.9 %
HGB BLD-MCNC: 13 G/DL
IMM GRANULOCYTES NFR BLD AUTO: 0.3 %
LYMPHOCYTES # BLD AUTO: 2.9 K/UL
LYMPHOCYTES NFR BLD AUTO: 25.3 %
MAN DIFF?: NORMAL
MCHC RBC-ENTMCNC: 28.3 PG
MCHC RBC-ENTMCNC: 31 GM/DL
MCV RBC AUTO: 91.3 FL
MONOCYTES # BLD AUTO: 0.83 K/UL
MONOCYTES NFR BLD AUTO: 7.3 %
NEUTROPHILS # BLD AUTO: 7.6 K/UL
NEUTROPHILS NFR BLD AUTO: 66.4 %
PLATELET # BLD AUTO: 326 K/UL
POTASSIUM SERPL-SCNC: 4.3 MMOL/L
PROT SERPL-MCNC: 7.4 G/DL
RBC # BLD: 4.59 M/UL
RBC # FLD: 13.5 %
SODIUM SERPL-SCNC: 139 MMOL/L
WBC # FLD AUTO: 11.44 K/UL

## 2022-04-06 ENCOUNTER — APPOINTMENT (OUTPATIENT)
Dept: ULTRASOUND IMAGING | Facility: CLINIC | Age: 32
End: 2022-04-06
Payer: COMMERCIAL

## 2022-04-06 ENCOUNTER — RESULT REVIEW (OUTPATIENT)
Age: 32
End: 2022-04-06

## 2022-04-06 PROCEDURE — 76700 US EXAM ABDOM COMPLETE: CPT

## 2022-05-05 ENCOUNTER — OUTPATIENT (OUTPATIENT)
Dept: OUTPATIENT SERVICES | Facility: HOSPITAL | Age: 32
LOS: 1 days | End: 2022-05-05
Payer: MEDICARE

## 2022-05-05 PROCEDURE — 88305 TISSUE EXAM BY PATHOLOGIST: CPT | Mod: 26

## 2022-05-06 ENCOUNTER — APPOINTMENT (OUTPATIENT)
Dept: OBGYN | Facility: CLINIC | Age: 32
End: 2022-05-06
Payer: COMMERCIAL

## 2022-05-06 VITALS
SYSTOLIC BLOOD PRESSURE: 110 MMHG | WEIGHT: 241 LBS | DIASTOLIC BLOOD PRESSURE: 74 MMHG | BODY MASS INDEX: 37.83 KG/M2 | HEIGHT: 67 IN

## 2022-05-06 DIAGNOSIS — N64.3 GALACTORRHEA NOT ASSOCIATED WITH CHILDBIRTH: ICD-10-CM

## 2022-05-06 DIAGNOSIS — N64.4 MASTODYNIA: ICD-10-CM

## 2022-05-06 PROCEDURE — 99213 OFFICE O/P EST LOW 20 MIN: CPT

## 2022-05-06 RX ORDER — DICLOXACILLIN SODIUM 500 MG/1
500 CAPSULE ORAL 4 TIMES DAILY
Qty: 28 | Refills: 0 | Status: DISCONTINUED | COMMUNITY
Start: 2021-08-13 | End: 2022-05-06

## 2022-05-06 NOTE — PHYSICAL EXAM
[Appropriately responsive] : appropriately responsive [Alert] : alert [No Acute Distress] : no acute distress [Soft] : soft [Non-tender] : non-tender [Non-distended] : non-distended [Oriented x3] : oriented x3 [Examination Of The Breasts] : a normal appearance [Normal] : normal [Breast Abnormal Lactation (Galactorrhea) Right] : galactorrhea [___cm] : a ~M [unfilled] ~Ucm superior medial quadrant mass was palpated [Rubbery] : rubbery [Mobile] : mobile [3:00] : in the 3:00 position [4:00] : in the 4:00 position [Breast Mass Left Breast ___cm] : no mass was palpable

## 2022-05-06 NOTE — PLAN
[FreeTextEntry1] : -Diagnostic right breast US ordered\par -If pain and galactorrhea persist, RTO for further investigation \par -She has already eliminated caffeine from her diet secondary to gastritis \par -Discussed supportive bra \par -Call or RTO for any other questions, concerns or problems

## 2022-05-06 NOTE — HISTORY OF PRESENT ILLNESS
[N] : Patient reports normal menses [Condoms] : uses condoms [Y] : Positive pregnancy history [Menarche Age: ____] : age at menarche was [unfilled] [Currently Active] : currently active [PapSmeardate] : 03/03/22 [TextBox_31] : NEG [HPVDate] : 03/03/22 [TextBox_78] : NEG [LMPDate] : 04/20/22 [PGxTotal] : 1 [[facility]] : 1 [[facility]iving] : 1 [FreeTextEntry1] : 04/20/22

## 2022-05-10 ENCOUNTER — APPOINTMENT (OUTPATIENT)
Dept: ULTRASOUND IMAGING | Facility: CLINIC | Age: 32
End: 2022-05-10
Payer: COMMERCIAL

## 2022-05-10 ENCOUNTER — RESULT REVIEW (OUTPATIENT)
Age: 32
End: 2022-05-10

## 2022-05-10 PROCEDURE — 76642 ULTRASOUND BREAST LIMITED: CPT | Mod: RT

## 2022-05-15 DIAGNOSIS — R11.2 NAUSEA WITH VOMITING, UNSPECIFIED: ICD-10-CM

## 2022-05-15 DIAGNOSIS — R10.13 EPIGASTRIC PAIN: ICD-10-CM

## 2022-05-25 ENCOUNTER — NON-APPOINTMENT (OUTPATIENT)
Age: 32
End: 2022-05-25
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
## 2022-06-30 ENCOUNTER — APPOINTMENT (OUTPATIENT)
Dept: RHEUMATOLOGY | Facility: CLINIC | Age: 32
End: 2022-06-30

## 2022-06-30 VITALS
HEIGHT: 67 IN | WEIGHT: 235 LBS | SYSTOLIC BLOOD PRESSURE: 116 MMHG | DIASTOLIC BLOOD PRESSURE: 73 MMHG | TEMPERATURE: 97.5 F | OXYGEN SATURATION: 100 % | BODY MASS INDEX: 36.88 KG/M2 | HEART RATE: 67 BPM

## 2022-06-30 PROCEDURE — 99214 OFFICE O/P EST MOD 30 MIN: CPT

## 2022-06-30 NOTE — HISTORY OF PRESENT ILLNESS
[FreeTextEntry1] : HISTORY: \par 32 y/o female presents as follow up for treatment of PsA and FMF.  \par Psoriatic arthritis was diagnosed ~2020 by Dr. Eitan Kumari in setting of inflammatory joint pains and psoriasis. Denies uveitis, dactylitis. Pt was on Otezla but stopped when she became pregnant. Pt did not have side effects with Otezla with improvement of her psoriasis and joint pains.  \par Pt has now given birth and now has flares of joint pains (hands, knees, hips, ankles) and psoriasis. Pt does not plan to have children in the future. Pt has stopped breastfeeding.  \par Pt had Hx of FMF and treated with colchicine but stopped after rhabdomyolysis. Pt would have recurrent high fever, joint pains, abdominal pain every ~3 months. Pt has not had these symptoms since pregnancy.  \par Mother and Father diagnosed with RA. Patient and father have FMF.  \par \par Workup by me with minimal positive JONO with negative JONO subserologies. Mildly elevated inflammatory markers. XR without inflammatory/erosive changes. \par Patient clinically has psoriatic arthritis in setting of inflammatory arthritis and psoriasis, which has been in remission during pregnancy, now returning after birth. Pt was restarted on Otezla in 11/2022 but stopped due to GI side effects. Given minimal symptoms of PsA, patient is currently being treated on PRN based with NSAIDs at this time. \par However, given pt is having not infrequent flares of FMF with risk of more severe complications as amyloidosis, patient was restarted on colchicine every other day (due to Hx of rhabdomyolysis on ?BID dosing of colchicine after being on the medication for many months). So far pt has not had any muscle weakness or pain. \par Pt to be worked up by GI for recurrent nausea, not apparently associated temporally with colchicine. \par \par INTERVAL HISTORY: \par Pt had nausea, abdominal pain, weakness, and had GI workup and started on PPI. Pt had endoscopy showing healing erosions. Pt takes Tums PRN for breakthrough symptoms but mostly improved. Pt now believes that pt's GI symptoms were not related to restarting Otezla.\par Pt has not had any major FMF flares since 12/2021. Reports recurrent pain of R foot for which she uses special insoles and changing shoes. Denies joint swelling, other concerning systemic symptoms.\par \par WORKUP:  \par Remarkable for (11/2021 - 3/2022): JONO 1:80 speckled, CRP 8 -> 5, ESR 26 -> 14\par Normal/neg (11/2021 - 3/2022): CBC, CMP, U/A, UPCR, dsDNA, SSA, SSB, , RNP, C3, C4, thyroid Ab, SSA, SSB, RF, CCP, CPK, Hep B/C panel, Quantiferon TB  \par XR b/l hands (12/2021): Normal  \par XR b/l knees (12/2021): Normal  \par XR SI joint (12/2021): Normal \par

## 2022-07-25 ENCOUNTER — APPOINTMENT (OUTPATIENT)
Dept: PSYCHIATRY | Facility: CLINIC | Age: 32
End: 2022-07-25

## 2022-07-25 PROCEDURE — 90791 PSYCH DIAGNOSTIC EVALUATION: CPT | Mod: 95

## 2022-08-04 ENCOUNTER — APPOINTMENT (OUTPATIENT)
Dept: PSYCHIATRY | Facility: CLINIC | Age: 32
End: 2022-08-04

## 2022-08-04 PROCEDURE — 90834 PSYTX W PT 45 MINUTES: CPT | Mod: 95

## 2022-08-08 ENCOUNTER — APPOINTMENT (OUTPATIENT)
Dept: PSYCHIATRY | Facility: CLINIC | Age: 32
End: 2022-08-08

## 2022-08-08 PROCEDURE — 90834 PSYTX W PT 45 MINUTES: CPT | Mod: 95

## 2022-08-15 ENCOUNTER — APPOINTMENT (OUTPATIENT)
Dept: PSYCHIATRY | Facility: CLINIC | Age: 32
End: 2022-08-15

## 2022-08-15 PROCEDURE — 90834 PSYTX W PT 45 MINUTES: CPT | Mod: 95

## 2022-08-22 ENCOUNTER — APPOINTMENT (OUTPATIENT)
Dept: PSYCHIATRY | Facility: CLINIC | Age: 32
End: 2022-08-22

## 2022-08-22 PROCEDURE — 90834 PSYTX W PT 45 MINUTES: CPT | Mod: 95

## 2022-09-07 ENCOUNTER — APPOINTMENT (OUTPATIENT)
Dept: PSYCHIATRY | Facility: CLINIC | Age: 32
End: 2022-09-07

## 2022-09-07 PROCEDURE — 90834 PSYTX W PT 45 MINUTES: CPT | Mod: 95

## 2022-09-09 ENCOUNTER — NON-APPOINTMENT (OUTPATIENT)
Age: 32
End: 2022-09-09

## 2022-09-12 ENCOUNTER — APPOINTMENT (OUTPATIENT)
Dept: PSYCHIATRY | Facility: CLINIC | Age: 32
End: 2022-09-12

## 2022-09-12 PROCEDURE — 90834 PSYTX W PT 45 MINUTES: CPT | Mod: 95

## 2022-09-16 ENCOUNTER — NON-APPOINTMENT (OUTPATIENT)
Age: 32
End: 2022-09-16

## 2022-09-19 ENCOUNTER — APPOINTMENT (OUTPATIENT)
Dept: PSYCHIATRY | Facility: CLINIC | Age: 32
End: 2022-09-19

## 2022-09-19 PROCEDURE — 90834 PSYTX W PT 45 MINUTES: CPT | Mod: 95

## 2022-09-22 ENCOUNTER — TRANSCRIPTION ENCOUNTER (OUTPATIENT)
Age: 32
End: 2022-09-22

## 2022-09-25 ENCOUNTER — NON-APPOINTMENT (OUTPATIENT)
Age: 32
End: 2022-09-25

## 2022-09-26 ENCOUNTER — APPOINTMENT (OUTPATIENT)
Dept: OBGYN | Facility: CLINIC | Age: 32
End: 2022-09-26

## 2022-09-26 ENCOUNTER — APPOINTMENT (OUTPATIENT)
Dept: PSYCHIATRY | Facility: CLINIC | Age: 32
End: 2022-09-26

## 2022-09-26 VITALS
HEIGHT: 67 IN | SYSTOLIC BLOOD PRESSURE: 108 MMHG | DIASTOLIC BLOOD PRESSURE: 62 MMHG | WEIGHT: 245 LBS | BODY MASS INDEX: 38.45 KG/M2

## 2022-09-26 DIAGNOSIS — N93.9 ABNORMAL UTERINE AND VAGINAL BLEEDING, UNSPECIFIED: ICD-10-CM

## 2022-09-26 DIAGNOSIS — Z11.3 ENCOUNTER FOR SCREENING FOR INFECTIONS WITH A PREDOMINANTLY SEXUAL MODE OF TRANSMISSION: ICD-10-CM

## 2022-09-26 DIAGNOSIS — N89.8 OTHER SPECIFIED NONINFLAMMATORY DISORDERS OF VAGINA: ICD-10-CM

## 2022-09-26 PROCEDURE — 90834 PSYTX W PT 45 MINUTES: CPT | Mod: 95

## 2022-09-26 PROCEDURE — 36415 COLL VENOUS BLD VENIPUNCTURE: CPT

## 2022-09-26 PROCEDURE — 99213 OFFICE O/P EST LOW 20 MIN: CPT

## 2022-09-28 ENCOUNTER — NON-APPOINTMENT (OUTPATIENT)
Age: 32
End: 2022-09-28

## 2022-09-28 LAB
C TRACH RRNA SPEC QL NAA+PROBE: NOT DETECTED
CANDIDA VAG CYTO: NOT DETECTED
G VAGINALIS+PREV SP MTYP VAG QL MICRO: NOT DETECTED
N GONORRHOEA RRNA SPEC QL NAA+PROBE: NOT DETECTED
SOURCE AMPLIFICATION: NORMAL
T VAGINALIS VAG QL WET PREP: NOT DETECTED

## 2022-09-28 NOTE — DISCUSSION/SUMMARY
[FreeTextEntry1] : She is having abnormal bleeding. We will obtain labs and sonogram to further evaluate. Bloodwork drawn today in office.\par \par Possible need for hysteroscopy D&C was reviewed. She will RTO for sonogram and further management.

## 2022-09-28 NOTE — HISTORY OF PRESENT ILLNESS
[N] : Patient reports normal menses [Condoms] : uses condoms [Y] : Positive pregnancy history [Menarche Age: ____] : age at menarche was [unfilled] [Currently Active] : currently active [PapSmeardate] : 03/03/22 [TextBox_31] : NEG [HPVDate] : 03/03/22 [TextBox_78] : NEG [LMPDate] : 08/31/2022 [PGxTotal] : 1 [[facility]] : 1 [[facility]iving] : 1 [FreeTextEntry1] : 08/31/2022

## 2022-09-28 NOTE — PHYSICAL EXAM
[Chaperone Present] : A chaperone was present in the examining room during all aspects of the physical examination [FreeTextEntry1] : leonides [Appropriately responsive] : appropriately responsive [Alert] : alert [No Acute Distress] : no acute distress [Oriented x3] : oriented x3 [Labia Majora] : normal [Labia Minora] : normal [No Bleeding] : There was no active vaginal bleeding [Normal] : normal [Uterine Adnexae] : normal

## 2022-09-30 ENCOUNTER — APPOINTMENT (OUTPATIENT)
Dept: ULTRASOUND IMAGING | Facility: CLINIC | Age: 32
End: 2022-09-30

## 2022-09-30 PROCEDURE — 76856 US EXAM PELVIC COMPLETE: CPT

## 2022-10-03 ENCOUNTER — APPOINTMENT (OUTPATIENT)
Dept: PSYCHIATRY | Facility: CLINIC | Age: 32
End: 2022-10-03

## 2022-10-03 PROCEDURE — 90834 PSYTX W PT 45 MINUTES: CPT | Mod: 95

## 2022-10-04 DIAGNOSIS — N91.1 SECONDARY AMENORRHEA: ICD-10-CM

## 2022-10-04 NOTE — COUNSELING
[Nutrition/ Exercise/ Weight Management] : nutrition, exercise, weight management [Vitamins/Supplements] : vitamins/supplements [Breast Self Exam] : breast self exam [Contraception/ Emergency Contraception/ Safe Sexual Practices] : contraception, emergency contraception, safe sexual practices [STD (testing, results, tx)] : STD (testing, results, tx) Clindamycin Counseling: I counseled the patient regarding use of clindamycin as an antibiotic for prophylactic and/or therapeutic purposes. Clindamycin is active against numerous classes of bacteria, including skin bacteria. Side effects may include nausea, diarrhea, gastrointestinal upset, rash, hives, yeast infections, and in rare cases, colitis.

## 2022-10-06 ENCOUNTER — APPOINTMENT (OUTPATIENT)
Dept: RHEUMATOLOGY | Facility: CLINIC | Age: 32
End: 2022-10-06

## 2022-10-06 VITALS
BODY MASS INDEX: 37.35 KG/M2 | OXYGEN SATURATION: 98 % | DIASTOLIC BLOOD PRESSURE: 75 MMHG | HEART RATE: 69 BPM | TEMPERATURE: 97.2 F | SYSTOLIC BLOOD PRESSURE: 108 MMHG | WEIGHT: 238 LBS | HEIGHT: 67 IN

## 2022-10-06 PROCEDURE — 99214 OFFICE O/P EST MOD 30 MIN: CPT

## 2022-10-06 NOTE — ASSESSMENT
[FreeTextEntry1] : 31 y/o female presents as follow up for treatment of PsA and FMF.  \par Psoriatic arthritis was diagnosed ~2020 by Dr. Eitan Kumari in setting of inflammatory joint pains and psoriasis. Denies uveitis, dactylitis. Pt was on Otezla but stopped when she became pregnant. Pt did not have side effects with Otezla with improvement of her psoriasis and joint pains.  \par Pt has now given birth and now has flares of joint pains (hands, knees, hips, ankles) and psoriasis. Pt does not plan to have children in the future. Pt has stopped breastfeeding.  \par Pt had Hx of FMF and treated with colchicine but stopped after rhabdomyolysis. Pt would have recurrent high fever, joint pains, abdominal pain every ~3 months. Pt has not had these symptoms since pregnancy.  \par Mother and Father diagnosed with RA. Patient and father have FMF.  \par \par Workup by me with minimal positive JONO with negative JONO subserologies. Mildly elevated inflammatory markers. XR without inflammatory/erosive changes.  \par Patient clinically has psoriatic arthritis in setting of inflammatory arthritis and psoriasis, which has been in remission during pregnancy, now returning after birth. Pt was restarted on Otezla in 11/2022 but stopped due to GI side effects. Given minimal symptoms of PsA, patient is currently being treated on PRN based with NSAIDs at this time.  \par However, given pt is having not infrequent flares of FMF with risk of more severe complications as amyloidosis, patient was restarted on colchicine every other day (due to Hx of rhabdomyolysis on ?BID dosing of colchicine after being on the medication for many months). So far pt has not had any muscle weakness/pain, or elevated CPKs.\par \par Workup by GI showed gastric ulcer/erosions and pt is now treated with PPI with resolution of her GI symptoms. Pt now believes her GI symptoms in the past was not related to Otezla. \par Pt has not had any major FMF flares since 12/2021. \par \par Pt had flare, mild psoriasis flare on 9/2022 in setting of emotional stressors and viral infection but the symptoms have since been resolving. Last CPK 9/2022 normal.\par \par - c/w colchicine 0.6mg every other day. Patient to stop the medication for any signs of muscle pain/weakness and I will obtain CPK level  \par - Continue off any chronic medication for PsA. If symptoms of PsA worsening, will consider restarting Otezla (side effect of GI symptom in past now thought to be related to gastric ulcer now being treated with PPI) \par - Advised to add ESR, CRP, CPK with labs every few months for follow up. Pt has no symptoms of FMF or PsA flares and no muscle pain/weakness. Pt had recent labwork with normal CBC, CMP, low positive (stable) CRP.\par - RTC 6 months or earlier if needed for follow up \par \par

## 2022-10-06 NOTE — HISTORY OF PRESENT ILLNESS
[FreeTextEntry1] : HISTORY: \par 31 y/o female presents as follow up for treatment of PsA and FMF.  \par Psoriatic arthritis was diagnosed ~2020 by Dr. Eitan Kumari in setting of inflammatory joint pains and psoriasis. Denies uveitis, dactylitis. Pt was on Otezla but stopped when she became pregnant. Pt did not have side effects with Otezla with improvement of her psoriasis and joint pains.  \par Pt has now given birth and now has flares of joint pains (hands, knees, hips, ankles) and psoriasis. Pt does not plan to have children in the future. Pt has stopped breastfeeding.  \par Pt had Hx of FMF and treated with colchicine but stopped after rhabdomyolysis. Pt would have recurrent high fever, joint pains, abdominal pain every ~3 months. Pt has not had these symptoms since pregnancy.  \par Mother and Father diagnosed with RA. Patient and father have FMF.  \par \par Workup by me with minimal positive JONO with negative JONO subserologies. Mildly elevated inflammatory markers. XR without inflammatory/erosive changes.  \par Patient clinically has psoriatic arthritis in setting of inflammatory arthritis and psoriasis, which has been in remission during pregnancy, now returning after birth. Pt was restarted on Otezla in 11/2022 but stopped due to GI side effects. Given minimal symptoms of PsA, patient is currently being treated on PRN based with NSAIDs at this time.  \par However, given pt is having not infrequent flares of FMF with risk of more severe complications as amyloidosis, patient was restarted on colchicine every other day (due to Hx of rhabdomyolysis on ?BID dosing of colchicine after being on the medication for many months). So far pt has not had any muscle weakness/pain, or elevated CPKs.  \par \par Workup by GI showed gastric ulcer/erosions and pt is now treated with PPI with resolution of her GI symptoms. Pt now believes her GI symptoms in the past was not related to Otezla. \par Pt has not had any major FMF flares since 12/2021. \par \par INTERVAL HISTORY: \par Pt started to see endocrinology/nutrition and started on metformin for PCOS. Pt had abnormal menstrual bleeding and went to Gyn and started on medication for thickened endometrium.\par Pt's grandmother passed away last week, since then she has had pharyngitis, fever (100.6F), viral infection from her baby with fatigue and malaise and abdominal pain (negative COVID testing).\par Pt's joint pains are well under control. Occasional psoriasis flare of scalp and inside ear, more frequent last week but now calming down, treated with topical creams PRN.\par Pt continues to be on colchicine every other day.\par Pt recently had labwork in 9/2022 showing stable CRP, normal CPK.\par \par WORKUP:  \par Remarkable for (11/2021 - 3/2022): JONO 1:80 speckled, CRP 8 -> 5, ESR 26 -> 14 \par Normal/neg (11/2021 - 3/2022): CBC, CMP, U/A, UPCR, dsDNA, SSA, SSB, , RNP, C3, C4, thyroid Ab, SSA, SSB, RF, CCP, CPK, Hep B/C panel, Quantiferon TB  \par XR b/l hands (12/2021): Normal  \par XR b/l knees (12/2021): Normal  \par XR SI joint (12/2021): Normal\par

## 2022-10-10 ENCOUNTER — APPOINTMENT (OUTPATIENT)
Dept: PSYCHIATRY | Facility: CLINIC | Age: 32
End: 2022-10-10

## 2022-10-10 ENCOUNTER — NON-APPOINTMENT (OUTPATIENT)
Age: 32
End: 2022-10-10

## 2022-10-13 ENCOUNTER — APPOINTMENT (OUTPATIENT)
Dept: PSYCHIATRY | Facility: CLINIC | Age: 32
End: 2022-10-13

## 2022-10-13 PROCEDURE — 90834 PSYTX W PT 45 MINUTES: CPT | Mod: 95

## 2022-10-14 ENCOUNTER — NON-APPOINTMENT (OUTPATIENT)
Age: 32
End: 2022-10-14

## 2022-10-17 ENCOUNTER — APPOINTMENT (OUTPATIENT)
Dept: PSYCHIATRY | Facility: CLINIC | Age: 32
End: 2022-10-17

## 2022-10-19 ENCOUNTER — APPOINTMENT (OUTPATIENT)
Dept: PSYCHIATRY | Facility: CLINIC | Age: 32
End: 2022-10-19

## 2022-10-19 PROCEDURE — 90834 PSYTX W PT 45 MINUTES: CPT | Mod: 95

## 2022-10-28 ENCOUNTER — APPOINTMENT (OUTPATIENT)
Dept: PSYCHIATRY | Facility: CLINIC | Age: 32
End: 2022-10-28

## 2022-10-28 ENCOUNTER — NON-APPOINTMENT (OUTPATIENT)
Age: 32
End: 2022-10-28

## 2022-10-28 ENCOUNTER — APPOINTMENT (OUTPATIENT)
Dept: ULTRASOUND IMAGING | Facility: CLINIC | Age: 32
End: 2022-10-28

## 2022-10-28 PROCEDURE — 76830 TRANSVAGINAL US NON-OB: CPT

## 2022-10-28 PROCEDURE — 90834 PSYTX W PT 45 MINUTES: CPT | Mod: 95

## 2022-10-29 ENCOUNTER — NON-APPOINTMENT (OUTPATIENT)
Age: 32
End: 2022-10-29

## 2022-11-03 ENCOUNTER — APPOINTMENT (OUTPATIENT)
Dept: PSYCHIATRY | Facility: CLINIC | Age: 32
End: 2022-11-03

## 2022-11-03 PROCEDURE — 90834 PSYTX W PT 45 MINUTES: CPT | Mod: 95

## 2022-11-07 ENCOUNTER — APPOINTMENT (OUTPATIENT)
Dept: PSYCHIATRY | Facility: CLINIC | Age: 32
End: 2022-11-07

## 2022-11-07 PROCEDURE — 90834 PSYTX W PT 45 MINUTES: CPT | Mod: 95

## 2022-11-11 ENCOUNTER — NON-APPOINTMENT (OUTPATIENT)
Age: 32
End: 2022-11-11

## 2022-11-16 ENCOUNTER — APPOINTMENT (OUTPATIENT)
Dept: OBGYN | Facility: CLINIC | Age: 32
End: 2022-11-16

## 2022-11-16 ENCOUNTER — APPOINTMENT (OUTPATIENT)
Dept: PSYCHIATRY | Facility: CLINIC | Age: 32
End: 2022-11-16

## 2022-11-16 VITALS
BODY MASS INDEX: 36.88 KG/M2 | SYSTOLIC BLOOD PRESSURE: 120 MMHG | WEIGHT: 235 LBS | DIASTOLIC BLOOD PRESSURE: 80 MMHG | HEIGHT: 67 IN

## 2022-11-16 DIAGNOSIS — N84.0 POLYP OF CORPUS UTERI: ICD-10-CM

## 2022-11-16 DIAGNOSIS — Z32.02 ENCOUNTER FOR PREGNANCY TEST, RESULT NEGATIVE: ICD-10-CM

## 2022-11-16 LAB
HCG UR QL: NEGATIVE
QUALITY CONTROL: YES

## 2022-11-16 PROCEDURE — 81025 URINE PREGNANCY TEST: CPT

## 2022-11-16 PROCEDURE — 58558Z: CUSTOM

## 2022-11-16 PROCEDURE — 90834 PSYTX W PT 45 MINUTES: CPT | Mod: 95

## 2022-11-17 NOTE — PROCEDURE
[Hysteroscopy] : Hysteroscopy [Consent Obtained] : Consent obtained [Removal of polyp] : removal of polyp [Risks] : risks [Benefits] : benefits [Alternatives] : alternatives [Patient] : patient [flexible] : Using aseptic technique a hysteroscopy was performed using a flexible hysteroscope [Sent to Pathology] : specimen was placed in buffered formalin and sent for pathology [Hemostasis obtained] : hemostasis obtained [Tolerated Well] : Patient tolerated the procedure well [Aftercare instructions/regstrictions given and follow-up scheduled] : Aftercare instructions/restrictions given and follow-up scheduled [Antibiotics given] : antibiotics not given [de-identified] : 2 small endometrial polyps, one originating near right cornua, one from posterior uterine wall [de-identified] : polyp, EMC

## 2022-11-21 ENCOUNTER — TRANSCRIPTION ENCOUNTER (OUTPATIENT)
Age: 32
End: 2022-11-21

## 2022-11-23 ENCOUNTER — APPOINTMENT (OUTPATIENT)
Dept: PSYCHIATRY | Facility: CLINIC | Age: 32
End: 2022-11-23

## 2022-11-23 LAB — CORE LAB BIOPSY: NORMAL

## 2022-11-23 PROCEDURE — 90834 PSYTX W PT 45 MINUTES: CPT | Mod: 95

## 2022-11-28 ENCOUNTER — APPOINTMENT (OUTPATIENT)
Dept: PSYCHIATRY | Facility: CLINIC | Age: 32
End: 2022-11-28

## 2022-12-31 ENCOUNTER — NON-APPOINTMENT (OUTPATIENT)
Age: 32
End: 2022-12-31

## 2023-02-09 ENCOUNTER — APPOINTMENT (OUTPATIENT)
Dept: DERMATOLOGY | Facility: CLINIC | Age: 33
End: 2023-02-09

## 2023-03-23 ENCOUNTER — APPOINTMENT (OUTPATIENT)
Dept: DERMATOLOGY | Facility: CLINIC | Age: 33
End: 2023-03-23
Payer: COMMERCIAL

## 2023-03-23 DIAGNOSIS — L82.1 OTHER SEBORRHEIC KERATOSIS: ICD-10-CM

## 2023-03-23 DIAGNOSIS — D22.5 MELANOCYTIC NEVI OF TRUNK: ICD-10-CM

## 2023-03-23 PROCEDURE — 99204 OFFICE O/P NEW MOD 45 MIN: CPT

## 2023-03-23 NOTE — HISTORY OF PRESENT ILLNESS
[de-identified] : Pt. presents for skin check;\par c/o few spots of concern;  lesion L breast;\par also:  hx psoriasis and PsA; was on Otezla in past, now on Colchicine; \par also has used clobetasol topically\par Severity:  mild  \par Modifying factors:  none\par Associated symptoms:  none\par Context:  no association with activity\par

## 2023-03-23 NOTE — ASSESSMENT
[FreeTextEntry1] : Complete skin examination is negative for malignancy; Multiple new concerns were addressed and discussed.\par Therapeutic options and their risks and benefits; along with multiple diagnostic possibilities were discussed at length;\par risks and benefits of skin biopsy and/or other further study were discussed;\par \par Benign early SK L breast, no tx needed;\par Hx psoriasis; improved since recent pregnancy;\par On colchicine, has clobetasol; f/u if skin involvement increases;\par \par Continue regular exams; Follow up for TBSE in 1 year\par

## 2023-03-23 NOTE — PHYSICAL EXAM
[Full Body Skin Exam Performed] : performed [FreeTextEntry3] : Skin examination performed of the face, neck, trunk, arms, legs; \par The patient is well, alert and oriented, pleasant and cooperative.\par Eyelids, conjunctivae, oral mucosa, digits and nails all normal.  \par No cervical adenopathy.\par \par Normal findings include:\par \par Scaling waxy stuck on papule; thin;  Left breast, superiomedial to areola\par Angiomas\par Multiple benign nevi were noted. \par \par No lesions were suspicious for malignancy. \par \par

## 2023-03-28 NOTE — OB RN PATIENT PROFILE - NS_PARA_OBGYN_ALL_OB_NU
----- Message from Sage Skaggs MD sent at 3/14/2023  6:48 AM EDT -----  03/14/23       Tell him that the colon polyps that were removed during his recent colonoscopy was not cancerous but was precancerous.  I would recommend that he have a repeat colonoscopy in 5 years.  Please send copy of this report to his PCP.  Thx. kjh   0

## 2023-03-31 ENCOUNTER — NON-APPOINTMENT (OUTPATIENT)
Age: 33
End: 2023-03-31

## 2023-03-31 ENCOUNTER — APPOINTMENT (OUTPATIENT)
Dept: RHEUMATOLOGY | Facility: CLINIC | Age: 33
End: 2023-03-31
Payer: COMMERCIAL

## 2023-03-31 VITALS
BODY MASS INDEX: 36.1 KG/M2 | OXYGEN SATURATION: 99 % | SYSTOLIC BLOOD PRESSURE: 110 MMHG | WEIGHT: 230 LBS | DIASTOLIC BLOOD PRESSURE: 74 MMHG | TEMPERATURE: 97.1 F | HEIGHT: 67 IN | HEART RATE: 65 BPM

## 2023-03-31 PROCEDURE — 99214 OFFICE O/P EST MOD 30 MIN: CPT | Mod: 25

## 2023-03-31 PROCEDURE — 36415 COLL VENOUS BLD VENIPUNCTURE: CPT

## 2023-03-31 NOTE — HISTORY OF PRESENT ILLNESS
[FreeTextEntry1] : HISTORY: \par 31 y/o female presents as follow up for treatment of PsA and FMF.  \par Psoriatic arthritis was diagnosed ~2020 by Dr. Eitan Kumari in setting of inflammatory joint pains and psoriasis. Denies uveitis, dactylitis. Pt was on Otezla but stopped when she became pregnant. Pt did not have side effects with Otezla with improvement of her psoriasis and joint pains.  \par Pt has now given birth and now has flares of joint pains (hands, knees, hips, ankles) and psoriasis. Pt does not plan to have children in the future. Pt has stopped breastfeeding.  \par Pt had Hx of FMF and treated with colchicine but stopped after rhabdomyolysis. Pt would have recurrent high fever, joint pains, abdominal pain every ~3 months. Pt has not had these symptoms since pregnancy.  \par Mother and Father diagnosed with RA. Patient and father have FMF.  \par \par Workup by me with minimal positive JONO with negative JONO subserologies. Mildly elevated inflammatory markers. XR without inflammatory/erosive changes.  \par Patient clinically has psoriatic arthritis in setting of inflammatory arthritis and psoriasis, which has been in remission during pregnancy, now returning after birth. Pt was restarted on Otezla in 11/2022 but stopped due to GI side effects. Given minimal symptoms of PsA, patient is currently being treated on PRN based with NSAIDs at this time.  \par However, given pt is having not infrequent flares of FMF with risk of more severe complications as amyloidosis, patient was restarted on colchicine every other day (due to Hx of rhabdomyolysis on ?BID dosing of colchicine after being on the medication for many months). So far pt has not had any muscle weakness/pain, or elevated CPKs. \par \par Workup by GI showed gastric ulcer/erosions and pt is now treated with PPI with resolution of her GI symptoms. Pt now believes her GI symptoms in the past was not related to Otezla.  \par Pt has not had any major FMF flares since 12/2021.  \par \par Pt had flare, mild psoriasis flare on 9/2022 in setting of emotional stressors and viral infection but the symptoms have since been resolving. Last CPK 9/2022 normal. \par \par INTERVAL HISTORY: \par Pt has been well since last visit without FMF flares. Pt has been having b/l feet issues including bone spurs, plantar fasciitis which is currently managed adequately with orthotics with podiatry.\par \par WORKUP:  \par Remarkable for (11/2021 - 3/2022): JONO 1:80 speckled, CRP 8 -> 5, ESR 26 -> 14  \par Normal/neg (11/2021 - 3/2022): CBC, CMP, U/A, UPCR, dsDNA, SSA, SSB, , RNP, C3, C4, thyroid Ab, SSA, SSB, RF, CCP, CPK, Hep B/C panel, Quantiferon TB  \par XR b/l hands (12/2021): Normal  \par XR b/l knees (12/2021): Normal  \par XR SI joint (12/2021): Normal\par

## 2023-03-31 NOTE — ASSESSMENT
[FreeTextEntry1] : 31 y/o female presents as follow up for treatment of PsA and FMF.  \par Psoriatic arthritis was diagnosed ~2020 by Dr. Eitan Kumari in setting of inflammatory joint pains and psoriasis. Denies uveitis, dactylitis. Pt was on Otezla but stopped when she became pregnant. Pt did not have side effects with Otezla with improvement of her psoriasis and joint pains.  \par Pt has now given birth and now has flares of joint pains (hands, knees, hips, ankles) and psoriasis. Pt does not plan to have children in the future. Pt has stopped breastfeeding.  \par Pt had Hx of FMF and treated with colchicine but stopped after rhabdomyolysis. Pt would have recurrent high fever, joint pains, abdominal pain every ~3 months. Pt has not had these symptoms since pregnancy.  \par Mother and Father diagnosed with RA. Patient and father have FMF.  \par \par Workup by me with minimal positive JONO with negative JONO subserologies. Mildly elevated inflammatory markers. XR without inflammatory/erosive changes.  \par Patient clinically has psoriatic arthritis in setting of inflammatory arthritis and psoriasis, which has been in remission during pregnancy, now returning after birth. Pt was restarted on Otezla in 11/2022 but stopped due to GI side effects. Given minimal symptoms of PsA, patient is currently being treated on PRN based with NSAIDs at this time.  \par However, given pt is having not infrequent flares of FMF with risk of more severe complications as amyloidosis, patient was restarted on colchicine every other day (due to Hx of rhabdomyolysis on ?BID dosing of colchicine after being on the medication for many months). So far pt has not had any muscle weakness/pain, or elevated CPKs. Pt has not had any major FMF flares since 12/2021. \par Workup by GI showed gastric ulcer/erosions and pt is now treated with PPI with resolution of her GI symptoms. Pt now believes her GI symptoms in the past was not related to Otezla. Pt has occasional psoriasis flares with viral infections and has heel spurs, plantar fasciitis adequately managed with orthotics. At this time, pt is staying off Otezla.\par \par - Obtain disease activity labs, CPK\par - c/w colchicine 0.6mg every other day. Patient to stop the medication for any signs of muscle pain/weakness and I will obtain CPK level  \par - Continue off any chronic medication for PsA. If symptoms of PsA worsening, will consider restarting Otezla\par - RTC 6 months or earlier if needed for follow up \par

## 2023-04-10 LAB
ALBUMIN SERPL ELPH-MCNC: 4.8 G/DL
ALP BLD-CCNC: 88 U/L
ALT SERPL-CCNC: 25 U/L
ANION GAP SERPL CALC-SCNC: 13 MMOL/L
AST SERPL-CCNC: 20 U/L
BASOPHILS # BLD AUTO: 0.04 K/UL
BASOPHILS NFR BLD AUTO: 0.4 %
BILIRUB SERPL-MCNC: <0.2 MG/DL
BUN SERPL-MCNC: 12 MG/DL
CALCIUM SERPL-MCNC: 9.8 MG/DL
CHLORIDE SERPL-SCNC: 102 MMOL/L
CK SERPL-CCNC: 118 U/L
CO2 SERPL-SCNC: 28 MMOL/L
CREAT SERPL-MCNC: 0.78 MG/DL
CRP SERPL-MCNC: 8 MG/L
EGFR: 103 ML/MIN/1.73M2
EOSINOPHIL # BLD AUTO: 0.12 K/UL
EOSINOPHIL NFR BLD AUTO: 1.3 %
ERYTHROCYTE [SEDIMENTATION RATE] IN BLOOD BY WESTERGREN METHOD: 10 MM/HR
GLUCOSE SERPL-MCNC: 87 MG/DL
HCT VFR BLD CALC: 40.8 %
HGB BLD-MCNC: 12.9 G/DL
IMM GRANULOCYTES NFR BLD AUTO: 0.1 %
LYMPHOCYTES # BLD AUTO: 3.44 K/UL
LYMPHOCYTES NFR BLD AUTO: 37 %
MAN DIFF?: NORMAL
MCHC RBC-ENTMCNC: 29.5 PG
MCHC RBC-ENTMCNC: 31.6 GM/DL
MCV RBC AUTO: 93.4 FL
MONOCYTES # BLD AUTO: 0.67 K/UL
MONOCYTES NFR BLD AUTO: 7.2 %
NEUTROPHILS # BLD AUTO: 5.02 K/UL
NEUTROPHILS NFR BLD AUTO: 54 %
PLATELET # BLD AUTO: 299 K/UL
POTASSIUM SERPL-SCNC: 4.3 MMOL/L
PROT SERPL-MCNC: 7.6 G/DL
RBC # BLD: 4.37 M/UL
RBC # FLD: 13.6 %
SODIUM SERPL-SCNC: 142 MMOL/L
WBC # FLD AUTO: 9.3 K/UL

## 2023-04-29 ENCOUNTER — NON-APPOINTMENT (OUTPATIENT)
Age: 33
End: 2023-04-29

## 2023-06-20 ENCOUNTER — NON-APPOINTMENT (OUTPATIENT)
Age: 33
End: 2023-06-20

## 2023-06-30 ENCOUNTER — OFFICE (OUTPATIENT)
Dept: URBAN - METROPOLITAN AREA CLINIC 103 | Facility: CLINIC | Age: 33
Setting detail: OPHTHALMOLOGY
End: 2023-06-30
Payer: COMMERCIAL

## 2023-06-30 DIAGNOSIS — T15.01XA: ICD-10-CM

## 2023-06-30 PROCEDURE — 65222 REMOVE FOREIGN BODY FROM EYE: CPT | Performed by: OPHTHALMOLOGY

## 2023-06-30 ASSESSMENT — REFRACTION_CURRENTRX
OD_OVR_VA: 20/
OD_CYLINDER: SPH
OS_VPRISM_DIRECTION: SV
OS_SPHERE: -1.25
OS_OVR_VA: 20/
OD_VPRISM_DIRECTION: SV
OD_SPHERE: -2.25
OS_CYLINDER: SPH
OS_AXIS: 0
OD_AXIS: 0

## 2023-06-30 ASSESSMENT — REFRACTION_MANIFEST
OD_SPHERE: -2.25
OS_CYLINDER: -0.25
OS_VA1: 20/20-
OD_CYLINDER: -0.50
OD_VA1: 20/20-
OS_SPHERE: +0.25
OS_VA1: 20/15-1
OS_AXIS: 10
OS_CYLINDER: -0.50
OS_SPHERE: -1.25
OD_VA1: 20/15-1
OD_SPHERE: +0.25
OD_CYLINDER: SPH
OD_AXIS: 180
OS_AXIS: 010

## 2023-06-30 ASSESSMENT — TONOMETRY
OD_IOP_MMHG: 14
OS_IOP_MMHG: 15

## 2023-06-30 ASSESSMENT — SPHEQUIV_DERIVED
OS_SPHEQUIV: 0.125
OD_SPHEQUIV: -2.5
OS_SPHEQUIV: 0
OS_SPHEQUIV: -1.375

## 2023-06-30 ASSESSMENT — REFRACTION_AUTOREFRACTION
OS_SPHERE: +0.25
OS_AXIS: 003
OS_CYLINDER: -0.25
OD_SPHERE: PLANO
OD_CYLINDER: SPHERE

## 2023-06-30 ASSESSMENT — VISUAL ACUITY
OD_BCVA: 20/20
OS_BCVA: 20/20

## 2023-06-30 ASSESSMENT — CONFRONTATIONAL VISUAL FIELD TEST (CVF)
OD_FINDINGS: FULL
OS_FINDINGS: FULL

## 2023-06-30 ASSESSMENT — VASCULARIZATION
OS_VASCULARIZATION: PANNUS
OD_VASCULARIZATION: PANNUS

## 2023-06-30 ASSESSMENT — CORNEAL TRAUMA - FOREIGN BODY: OD_FOREIGNBODY: NON METALLIC PRESENT

## 2023-07-06 ENCOUNTER — OFFICE (OUTPATIENT)
Dept: URBAN - METROPOLITAN AREA CLINIC 38 | Facility: CLINIC | Age: 33
Setting detail: OPHTHALMOLOGY
End: 2023-07-06
Payer: COMMERCIAL

## 2023-07-06 DIAGNOSIS — H17.9: ICD-10-CM

## 2023-07-06 PROBLEM — H16.423 CORNEAL PANNUS; BOTH EYES: Status: ACTIVE | Noted: 2023-06-30

## 2023-07-06 PROCEDURE — 99213 OFFICE O/P EST LOW 20 MIN: CPT | Performed by: OPHTHALMOLOGY

## 2023-07-06 ASSESSMENT — KERATOMETRY
OS_K1POWER_DIOPTERS: 41.00
OD_K2POWER_DIOPTERS: 42.50
OD_AXISANGLE_DEGREES: 086
METHOD_AUTO_MANUAL: AUTO
OS_K2POWER_DIOPTERS: 42.00
OS_AXISANGLE_DEGREES: 096
OD_K1POWER_DIOPTERS: 41.50

## 2023-07-06 ASSESSMENT — SPHEQUIV_DERIVED
OS_SPHEQUIV: 0
OS_SPHEQUIV: 0.125
OD_SPHEQUIV: -2.5
OS_SPHEQUIV: -1.375

## 2023-07-06 ASSESSMENT — CONFRONTATIONAL VISUAL FIELD TEST (CVF)
OD_FINDINGS: FULL
OS_FINDINGS: FULL

## 2023-07-06 ASSESSMENT — AXIALLENGTH_DERIVED
OS_AL: 24.9345
OD_AL: 25.2223
OS_AL: 24.2984
OS_AL: 24.3501

## 2023-07-06 ASSESSMENT — REFRACTION_CURRENTRX
OD_OVR_VA: 20/
OS_SPHERE: -1.25
OS_VPRISM_DIRECTION: SV
OD_VPRISM_DIRECTION: SV
OS_CYLINDER: SPH
OD_CYLINDER: SPH
OD_AXIS: 0
OS_OVR_VA: 20/
OS_AXIS: 0
OD_SPHERE: -2.25

## 2023-07-06 ASSESSMENT — REFRACTION_MANIFEST
OD_CYLINDER: -0.50
OD_SPHERE: +0.25
OS_CYLINDER: -0.25
OS_SPHERE: -1.25
OD_SPHERE: -2.25
OS_AXIS: 010
OS_VA1: 20/15-1
OD_VA1: 20/20-
OS_CYLINDER: -0.50
OS_AXIS: 10
OD_VA1: 20/15-1
OD_AXIS: 180
OS_SPHERE: +0.25
OD_CYLINDER: SPH
OS_VA1: 20/20-

## 2023-07-06 ASSESSMENT — CORNEAL SURGICAL SCARRING: OD_SCARRING: ANTERIOR

## 2023-07-06 ASSESSMENT — VISUAL ACUITY
OS_BCVA: 20/20
OD_BCVA: 20/20

## 2023-07-06 ASSESSMENT — CORNEAL TRAUMA - FOREIGN BODY: OD_FOREIGNBODY: ABSENT

## 2023-07-06 ASSESSMENT — REFRACTION_AUTOREFRACTION
OS_AXIS: 003
OS_SPHERE: +0.25
OD_CYLINDER: SPHERE
OD_SPHERE: PLANO
OS_CYLINDER: -0.25

## 2023-07-11 ENCOUNTER — APPOINTMENT (OUTPATIENT)
Dept: FAMILY MEDICINE | Facility: CLINIC | Age: 33
End: 2023-07-11
Payer: COMMERCIAL

## 2023-07-11 VITALS
HEART RATE: 64 BPM | RESPIRATION RATE: 16 BRPM | WEIGHT: 227 LBS | OXYGEN SATURATION: 98 % | BODY MASS INDEX: 35.63 KG/M2 | DIASTOLIC BLOOD PRESSURE: 72 MMHG | SYSTOLIC BLOOD PRESSURE: 110 MMHG | HEIGHT: 67 IN

## 2023-07-11 DIAGNOSIS — Z00.00 ENCOUNTER FOR GENERAL ADULT MEDICAL EXAMINATION W/OUT ABNORMAL FINDINGS: ICD-10-CM

## 2023-07-11 DIAGNOSIS — F41.0 PANIC DISORDER [EPISODIC PAROXYSMAL ANXIETY]: ICD-10-CM

## 2023-07-11 PROCEDURE — 36415 COLL VENOUS BLD VENIPUNCTURE: CPT

## 2023-07-11 PROCEDURE — 99214 OFFICE O/P EST MOD 30 MIN: CPT | Mod: 25

## 2023-07-11 RX ORDER — ONDANSETRON 4 MG/1
4 TABLET ORAL EVERY 8 HOURS
Qty: 21 | Refills: 0 | Status: DISCONTINUED | COMMUNITY
Start: 2021-08-10 | End: 2023-07-11

## 2023-07-11 RX ORDER — MEDROXYPROGESTERONE ACETATE 5 MG/1
5 TABLET ORAL
Qty: 10 | Refills: 3 | Status: DISCONTINUED | COMMUNITY
Start: 2022-10-04 | End: 2023-07-11

## 2023-07-11 RX ORDER — ALPRAZOLAM 0.25 MG/1
0.25 TABLET, ORALLY DISINTEGRATING ORAL
Qty: 60 | Refills: 0 | Status: ACTIVE | COMMUNITY
Start: 2023-07-11 | End: 1900-01-01

## 2023-07-14 LAB
ALBUMIN SERPL ELPH-MCNC: 4.8 G/DL
ALP BLD-CCNC: 88 U/L
ALT SERPL-CCNC: 17 U/L
ANION GAP SERPL CALC-SCNC: 13 MMOL/L
AST SERPL-CCNC: 17 U/L
BILIRUB SERPL-MCNC: 0.3 MG/DL
BUN SERPL-MCNC: 8 MG/DL
CALCIUM SERPL-MCNC: 10.1 MG/DL
CHLORIDE SERPL-SCNC: 101 MMOL/L
CHOLEST SERPL-MCNC: 138 MG/DL
CO2 SERPL-SCNC: 26 MMOL/L
CREAT SERPL-MCNC: 0.75 MG/DL
EGFR: 108 ML/MIN/1.73M2
ESTIMATED AVERAGE GLUCOSE: 111 MG/DL
FOLATE SERPL-MCNC: 12.3 NG/ML
GLUCOSE SERPL-MCNC: 88 MG/DL
HBA1C MFR BLD HPLC: 5.5 %
HDLC SERPL-MCNC: 46 MG/DL
LDLC SERPL CALC-MCNC: 79 MG/DL
NONHDLC SERPL-MCNC: 92 MG/DL
POTASSIUM SERPL-SCNC: 4.1 MMOL/L
PROT SERPL-MCNC: 7.7 G/DL
SODIUM SERPL-SCNC: 139 MMOL/L
T4 SERPL-MCNC: 8.8 UG/DL
TRIGL SERPL-MCNC: 68 MG/DL
VIT B12 SERPL-MCNC: 732 PG/ML

## 2023-07-15 NOTE — ASSESSMENT
[FreeTextEntry1] : 32 year old WF with PMH anxiety now heightened with Panic attacks" \par \par The following plan was discussed with patient:\par \par #1 Much support and discussion \par \par #2  agree to PRN SL xanax 'to dampen fuse " in the moment. \par \par #3  Continue supportive providers.\par \par #4 B12 as per request. \par \par FU in one month. \par \par Best wishes offered!\par

## 2023-07-15 NOTE — PHYSICAL EXAM
[No Acute Distress] : no acute distress [Normal Sclera/Conjunctiva] : normal sclera/conjunctiva [Normal Outer Ear/Nose] : the outer ears and nose were normal in appearance [Supple] : supple [No Respiratory Distress] : no respiratory distress  [No Joint Swelling] : no joint swelling [No Rash] : no rash [No Focal Deficits] : no focal deficits [Normal Gait] : normal gait [Speech Grossly Normal] : speech grossly normal [Alert and Oriented x3] : oriented to person, place, and time [No JVD] : no jugular venous distention [No Accessory Muscle Use] : no accessory muscle use [Normal Rate] : normal rate  [Regular Rhythm] : with a regular rhythm [de-identified] :  engaging and articulate  [de-identified] : DYLAN

## 2023-07-15 NOTE — HISTORY OF PRESENT ILLNESS
[FreeTextEntry1] : Selma would like to discuss anxiety that is not getting better. Panic attacks 1-2 x per week  [de-identified] : Today presents in FUV with regard to chronic medical conditions of: panic attacks. She describes her panic attacks as conducive toward nausea, jitters, and that they last acutely for approximately 30 minutes. She went back to work after her panic attack and called the employee system number at [facility] to enroll in a CBC program through telehealth. She endorses that the program helped her.\par \par PMHx: Familial Mediterranean Fever. She sees Dr. Raza from rheumatology for this and will follow-up with him in October, 2023.\par \par Chief complaint: post-partum anxiety that started 2 years ago when patient had her baby.\par \par  Since last encounter: She meets with a psychotherapist every other week for her panic-attacks and anxiety. She will see NP Maeve Cole next week for psychiatry through Tele-health. [phone]   She engages in therapeutic breathing as well as mediation apps. \par \par Denies any ER visit/UC visits, hospital visits, MVA's or New MSK injuries\par \par Social:  is  with one child Wyandanch age 2;  mother-in law lives with them  "not great"\par              Moving soon to new home

## 2023-07-15 NOTE — PLAN
[FreeTextEntry1] : The patient was prescribed sublingual xanax for her panic-attacks. She was instructed to take one tablet at the onset of her panic-attack symptoms. Furthermore, her labs were taken in the office today. She was given a B-12 injection in the office today.

## 2023-07-15 NOTE — REVIEW OF SYSTEMS
[Discharge] : no discharge [Redness] : no redness [Chest Pain] : no chest pain [Earache] : no earache [Shortness Of Breath] : no shortness of breath [Abdominal Pain] : no abdominal pain [Dysuria] : no dysuria [Joint Pain] : no joint pain [Headache] : no headache [Dizziness] : no dizziness [Anxiety] : anxiety

## 2023-07-15 NOTE — ADDENDUM
[FreeTextEntry1] : Medical record entries made by the scribe today, were at my direction and personally dictated to\par them by me, Dr. Monserrat Arce on 07/11/2023. I have reviewed the chart and\par agree that the record accurately reflects my personal performance of the history, physical exam,\par assessment and plan.\par \par \par I, Amber Armenta acting as scribe for Dr. Monserrat Arce MD on 07/11/2023 at\par 11:11 AM.\par

## 2023-07-20 ENCOUNTER — NON-APPOINTMENT (OUTPATIENT)
Age: 33
End: 2023-07-20

## 2023-08-07 NOTE — OB RN DELIVERY SUMMARY - [facility]_OBGYN_ALL_OB
Liveborn I will STOP taking the medications listed below when I get home from the hospital:    Prenatal 1 oral capsule  -- 1 cap(s) by mouth once a day

## 2023-08-28 ENCOUNTER — NON-APPOINTMENT (OUTPATIENT)
Age: 33
End: 2023-08-28

## 2023-08-28 ENCOUNTER — APPOINTMENT (OUTPATIENT)
Dept: CARDIOLOGY | Facility: CLINIC | Age: 33
End: 2023-08-28
Payer: COMMERCIAL

## 2023-08-28 VITALS
HEART RATE: 68 BPM | BODY MASS INDEX: 35.47 KG/M2 | WEIGHT: 226 LBS | DIASTOLIC BLOOD PRESSURE: 76 MMHG | HEIGHT: 67 IN | OXYGEN SATURATION: 100 % | SYSTOLIC BLOOD PRESSURE: 110 MMHG

## 2023-08-28 DIAGNOSIS — R00.2 PALPITATIONS: ICD-10-CM

## 2023-08-28 PROCEDURE — 93000 ELECTROCARDIOGRAM COMPLETE: CPT

## 2023-08-28 PROCEDURE — 99203 OFFICE O/P NEW LOW 30 MIN: CPT

## 2023-08-28 RX ORDER — BACILLUS COAGULANS/INULIN 1B-250 MG
CAPSULE ORAL
Refills: 0 | Status: ACTIVE | COMMUNITY

## 2023-08-28 RX ORDER — CHROMIUM 200 MCG
TABLET ORAL
Refills: 0 | Status: ACTIVE | COMMUNITY

## 2023-08-28 NOTE — HISTORY OF PRESENT ILLNESS
[FreeTextEntry1] : 32F w/ PMH of F presents for evaluation of palpitations.  She has noticed that she has had palpitations lasting for 20 seconds at a time occurring over the last several weeks on an almost daily basis.  They are not related to periods of stress or anxiety.  They tend to resolve with some breathing.  They are not significantly associated with lightheadedness or dizziness.  There is no prior history of syncope.  She denies anginal chest pains.

## 2023-08-28 NOTE — REASON FOR VISIT
[Arrhythmia/ECG Abnorrmalities] : arrhythmia/ECG abnormalities [Infectious/Inflammatory] : infectious/inflammatory [FreeTextEntry3] : Dr. Arce

## 2023-08-28 NOTE — DISCUSSION/SUMMARY
[Patient] : the patient [With PCP] : with ~his/her~ primary care provider [FreeTextEntry1] : 32-year-old female with past medical history as above presenting for evaluation of palpitations.  She has never had monitoring or definition of her heart structure.  1.  Palpitations-obtain TTE and 1 week Zio patch monitor.  2.  Lipid profile - well controlled.  RTC PRN

## 2023-09-07 ENCOUNTER — OFFICE (OUTPATIENT)
Dept: URBAN - METROPOLITAN AREA CLINIC 38 | Facility: CLINIC | Age: 33
Setting detail: OPHTHALMOLOGY
End: 2023-09-07
Payer: COMMERCIAL

## 2023-09-07 DIAGNOSIS — H17.9: ICD-10-CM

## 2023-09-07 DIAGNOSIS — H40.013: ICD-10-CM

## 2023-09-07 PROCEDURE — 92014 COMPRE OPH EXAM EST PT 1/>: CPT | Performed by: OPHTHALMOLOGY

## 2023-09-07 ASSESSMENT — AXIALLENGTH_DERIVED
OD_AL: 24.4484
OD_AL: 25.5411
OS_AL: 24.8324
OS_AL: 24.0993

## 2023-09-07 ASSESSMENT — REFRACTION_MANIFEST
OS_AXIS: 010
OU_VA: 20/15
OS_VA1: 20/15
OD_SPHERE: -2.25
OS_CYLINDER: SPHERE
OD_CYLINDER: -0.50
OD_SPHERE: PLANO
OD_VA1: 20/15
OD_CYLINDER: SPHERE
OS_SPHERE: -1.25
OD_VA1: 20/20-
OS_VA1: 20/20-
OS_SPHERE: PLANO
OD_AXIS: 180
OS_CYLINDER: -0.25

## 2023-09-07 ASSESSMENT — SPHEQUIV_DERIVED
OD_SPHEQUIV: -2.5
OS_SPHEQUIV: 0.375
OD_SPHEQUIV: 0
OS_SPHEQUIV: -1.375

## 2023-09-07 ASSESSMENT — KERATOMETRY
METHOD_AUTO_MANUAL: AUTO
OS_AXISANGLE_DEGREES: 099
OD_K1POWER_DIOPTERS: 40.75
OD_K2POWER_DIOPTERS: 41.75
OS_K1POWER_DIOPTERS: 41.25
OD_AXISANGLE_DEGREES: 084
OS_K2POWER_DIOPTERS: 42.25

## 2023-09-07 ASSESSMENT — REFRACTION_AUTOREFRACTION
OS_AXIS: 012
OD_AXIS: 164
OS_CYLINDER: -0.25
OS_SPHERE: +0.50
OD_SPHERE: +0.25
OD_CYLINDER: -0.50

## 2023-09-07 ASSESSMENT — TONOMETRY
OS_IOP_MMHG: 13
OD_IOP_MMHG: 16

## 2023-09-07 ASSESSMENT — CORNEAL SURGICAL SCARRING: OD_SCARRING: ANTERIOR

## 2023-09-07 ASSESSMENT — VISUAL ACUITY
OD_BCVA: 20/15
OS_BCVA: 20/15

## 2023-09-07 ASSESSMENT — CONFRONTATIONAL VISUAL FIELD TEST (CVF)
OD_FINDINGS: FULL
OS_FINDINGS: FULL

## 2023-09-07 ASSESSMENT — REFRACTION_CURRENTRX
OD_OVR_VA: 20/
OS_OVR_VA: 20/

## 2023-09-13 ENCOUNTER — APPOINTMENT (OUTPATIENT)
Dept: CARDIOLOGY | Facility: CLINIC | Age: 33
End: 2023-09-13
Payer: COMMERCIAL

## 2023-09-13 PROCEDURE — 93306 TTE W/DOPPLER COMPLETE: CPT

## 2023-10-03 ENCOUNTER — APPOINTMENT (OUTPATIENT)
Dept: RHEUMATOLOGY | Facility: CLINIC | Age: 33
End: 2023-10-03
Payer: COMMERCIAL

## 2023-10-03 VITALS
TEMPERATURE: 97.4 F | BODY MASS INDEX: 35.31 KG/M2 | OXYGEN SATURATION: 99 % | DIASTOLIC BLOOD PRESSURE: 69 MMHG | HEART RATE: 56 BPM | HEIGHT: 67 IN | SYSTOLIC BLOOD PRESSURE: 105 MMHG | WEIGHT: 225 LBS

## 2023-10-03 PROCEDURE — 99214 OFFICE O/P EST MOD 30 MIN: CPT

## 2023-10-05 ENCOUNTER — APPOINTMENT (OUTPATIENT)
Dept: CARDIOLOGY | Facility: CLINIC | Age: 33
End: 2023-10-05

## 2023-10-28 ENCOUNTER — NON-APPOINTMENT (OUTPATIENT)
Age: 33
End: 2023-10-28

## 2023-12-04 NOTE — ASSESSMENT
[FreeTextEntry1] : 30 y/o female presents as follow up for treatment of PsA and FMF.  \par Psoriatic arthritis was diagnosed ~2020 by Dr. Eitan Kumari in setting of inflammatory joint pains and psoriasis. Denies uveitis, dactylitis. Pt was on Otezla but stopped when she became pregnant. Pt did not have side effects with Otezla with improvement of her psoriasis and joint pains.  \par Pt has now given birth and now has flares of joint pains (hands, knees, hips, ankles) and psoriasis. Pt does not plan to have children in the future. Pt has stopped breastfeeding.  \par Pt had Hx of FMF and treated with colchicine but stopped after rhabdomyolysis. Pt would have recurrent high fever, joint pains, abdominal pain every ~3 months. Pt has not had these symptoms since pregnancy.  \par Mother and Father diagnosed with RA. Patient and father have FMF.  \par \par Workup by me with minimal positive JONO with negative JONO subserologies. Mildly elevated inflammatory markers. XR without inflammatory/erosive changes. \par Patient clinically has psoriatic arthritis in setting of inflammatory arthritis and psoriasis, which has been in remission during pregnancy, now returning after birth. Pt was restarted on Otezla in 11/2022 but stopped due to GI side effects. Given minimal symptoms of PsA, patient is currently being treated on PRN based with NSAIDs at this time. \par However, given pt is having not infrequent flares of FMF with risk of more severe complications as amyloidosis, patient was restarted on colchicine every other day (due to Hx of rhabdomyolysis on ?BID dosing of colchicine after being on the medication for many months). So far pt has not had any muscle weakness/pain, or elevated CPKs. \par Workup by GI showed gastric ulcer/erosions and pt is now treated with PPI with resolution of her GI symptoms. Pt now believes her GI symptoms in the past was not related to Otezla.\par Pt has not had any major FMF flares since 12/2021.\par \par - Pt will get muscle enzymes done with next labs outpatient. Pt has no symptoms of FMF or PsA flares and no muscle pain/weakness. Pt had recent labwork with normal CBC, CMP, low positive CRP. Will defer bloodwork today.\par - c/w colchicine 0.6mg every other day. Patient to stop the medciation for any signs of muscle pain/weakness and I will obtain CPK level  \par - No chronic medication for PsA for now. If symptoms of PsA worsening, will consider restarting Otezla (side effect of GI symptom in past now thought to be related to gastric ulcer now being treated with PPI)\par - RTC 3 months or earlier if needed for follow up \par  Yes

## 2023-12-11 ENCOUNTER — NON-APPOINTMENT (OUTPATIENT)
Age: 33
End: 2023-12-11

## 2024-01-02 ENCOUNTER — APPOINTMENT (OUTPATIENT)
Dept: ORTHOPEDIC SURGERY | Facility: CLINIC | Age: 34
End: 2024-01-02
Payer: COMMERCIAL

## 2024-01-02 DIAGNOSIS — M22.42 CHONDROMALACIA PATELLAE, LEFT KNEE: ICD-10-CM

## 2024-01-02 PROCEDURE — 99203 OFFICE O/P NEW LOW 30 MIN: CPT

## 2024-01-02 PROCEDURE — 73562 X-RAY EXAM OF KNEE 3: CPT | Mod: LT

## 2024-01-02 NOTE — HISTORY OF PRESENT ILLNESS
[de-identified] :  Patient presents for evaluation on LT knee pain. Patient denies any injury, states she has been doing exercise program for the past few months and thought pain would resolve on its own but hasn't. Patient states that she feels buckling in her left knee and painful on the side of her knee. Patient also notes she has trouble with stairs. Patient states that she has been trying massage and resting but no relief. Patient states she has a history of psoriatic arthritis and FMF for which she takes colchicine. Patient is taking Tylenol as needed.

## 2024-01-02 NOTE — PHYSICAL EXAM
[NL (140)] : flexion 140 degrees [NL (0)] : extension 0 degrees [5___] : hamstring 5[unfilled]/5 [] : patient ambulates without assistive device [Left] : left knee [AP] : anteroposterior [Lateral] : lateral [[place]] : skyline [There are no fractures, subluxations or dislocations. No significant abnormalities are seen] : There are no fractures, subluxations or dislocations. No significant abnormalities are seen [FreeTextEntry9] : lateral patella tilt on sunrise view

## 2024-01-02 NOTE — DISCUSSION/SUMMARY
[de-identified] : I reviewed patient's radiographs and discussed her condition and treatment options.  I advised starting course of PT in addition to HEP (barre).  Follow up in 6 weeks.  Patient voiced understanding and agreement with the plan.

## 2024-01-30 ENCOUNTER — OFFICE (OUTPATIENT)
Dept: URBAN - METROPOLITAN AREA CLINIC 103 | Facility: CLINIC | Age: 34
Setting detail: OPHTHALMOLOGY
End: 2024-01-30
Payer: COMMERCIAL

## 2024-01-30 DIAGNOSIS — H10.433: ICD-10-CM

## 2024-01-30 PROCEDURE — 92012 INTRM OPH EXAM EST PATIENT: CPT | Performed by: OPHTHALMOLOGY

## 2024-01-30 ASSESSMENT — REFRACTION_MANIFEST
OS_AXIS: 010
OD_VA1: 20/20-
OS_VA1: 20/15
OS_CYLINDER: SPHERE
OD_VA1: 20/15
OU_VA: 20/15
OD_CYLINDER: SPHERE
OD_SPHERE: PLANO
OD_AXIS: 180
OS_SPHERE: -1.25
OS_CYLINDER: -0.25
OD_SPHERE: -2.25
OD_CYLINDER: -0.50
OS_VA1: 20/20-
OS_SPHERE: PLANO

## 2024-01-30 ASSESSMENT — REFRACTION_AUTOREFRACTION
OS_CYLINDER: SPH
OD_CYLINDER: -0.50
OS_SPHERE: +0.25
OD_AXIS: 179
OD_SPHERE: -0.25

## 2024-01-30 ASSESSMENT — SUPERFICIAL PUNCTATE KERATITIS (SPK)
OD_SPK: ABSENT
OS_SPK: ABSENT

## 2024-01-30 ASSESSMENT — CORNEAL SURGICAL SCARRING: OD_SCARRING: ANTERIOR

## 2024-01-30 ASSESSMENT — SPHEQUIV_DERIVED
OS_SPHEQUIV: -1.375
OD_SPHEQUIV: -2.5
OD_SPHEQUIV: -0.5

## 2024-01-30 ASSESSMENT — CONFRONTATIONAL VISUAL FIELD TEST (CVF)
OS_FINDINGS: FULL
OD_FINDINGS: FULL

## 2024-02-20 ENCOUNTER — APPOINTMENT (OUTPATIENT)
Dept: ORTHOPEDIC SURGERY | Facility: CLINIC | Age: 34
End: 2024-02-20

## 2024-03-10 ENCOUNTER — RX RENEWAL (OUTPATIENT)
Age: 34
End: 2024-03-10

## 2024-03-11 ENCOUNTER — NON-APPOINTMENT (OUTPATIENT)
Age: 34
End: 2024-03-11

## 2024-04-02 ENCOUNTER — APPOINTMENT (OUTPATIENT)
Dept: RHEUMATOLOGY | Facility: CLINIC | Age: 34
End: 2024-04-02
Payer: COMMERCIAL

## 2024-04-02 ENCOUNTER — APPOINTMENT (OUTPATIENT)
Dept: OBGYN | Facility: CLINIC | Age: 34
End: 2024-04-02
Payer: COMMERCIAL

## 2024-04-02 VITALS
TEMPERATURE: 98 F | OXYGEN SATURATION: 82 % | DIASTOLIC BLOOD PRESSURE: 71 MMHG | SYSTOLIC BLOOD PRESSURE: 109 MMHG | HEART RATE: 46 BPM

## 2024-04-02 VITALS
WEIGHT: 234 LBS | BODY MASS INDEX: 36.73 KG/M2 | HEIGHT: 67 IN | DIASTOLIC BLOOD PRESSURE: 70 MMHG | SYSTOLIC BLOOD PRESSURE: 115 MMHG

## 2024-04-02 DIAGNOSIS — Z79.899 OTHER LONG TERM (CURRENT) DRUG THERAPY: ICD-10-CM

## 2024-04-02 DIAGNOSIS — M04.1 PERIODIC FEVER SYNDROMES: ICD-10-CM

## 2024-04-02 DIAGNOSIS — Z01.419 ENCOUNTER FOR GYNECOLOGICAL EXAMINATION (GENERAL) (ROUTINE) W/OUT ABNORMAL FINDINGS: ICD-10-CM

## 2024-04-02 DIAGNOSIS — L40.50 ARTHROPATHIC PSORIASIS, UNSPECIFIED: ICD-10-CM

## 2024-04-02 DIAGNOSIS — Z80.41 FAMILY HISTORY OF MALIGNANT NEOPLASM OF OVARY: ICD-10-CM

## 2024-04-02 DIAGNOSIS — L40.9 PSORIASIS, UNSPECIFIED: ICD-10-CM

## 2024-04-02 DIAGNOSIS — Z12.4 ENCOUNTER FOR SCREENING FOR MALIGNANT NEOPLASM OF CERVIX: ICD-10-CM

## 2024-04-02 PROCEDURE — G2211 COMPLEX E/M VISIT ADD ON: CPT

## 2024-04-02 PROCEDURE — 99214 OFFICE O/P EST MOD 30 MIN: CPT

## 2024-04-02 PROCEDURE — 99395 PREV VISIT EST AGE 18-39: CPT

## 2024-04-02 NOTE — ASSESSMENT
[FreeTextEntry1] : 34 y/o female presents as follow up for treatment of PsA and FMF.  Psoriatic arthritis was diagnosed ~2020 by Dr. Eitan Kumari in setting of inflammatory joint pains and psoriasis. Denies uveitis, dactylitis. Pt was on Otezla but stopped when she became pregnant. Pt did not have side effects with Otezla with improvement of her psoriasis and joint pains.  Pt has now given birth and now has flares of joint pains (hands, knees, hips, ankles) and psoriasis. Pt does not plan to have children in the future. Pt has stopped breastfeeding.  Pt had Hx of FMF and treated with colchicine but stopped after rhabdomyolysis. Pt would have recurrent high fever, joint pains, abdominal pain every ~3 months. Pt has not had these symptoms since pregnancy.  Mother and Father diagnosed with RA. Patient and father have FMF.   Workup by me with minimal positive JONO with negative JONO subserologies. Mildly elevated inflammatory markers. XR without inflammatory/erosive changes.  Patient clinically has psoriatic arthritis in setting of inflammatory arthritis and psoriasis, which has been in remission during pregnancy, now returning after birth. Pt was restarted on Otezla in 11/2022 but stopped due to GI side effects. Given minimal symptoms of PsA, patient is currently being treated on PRN based with NSAIDs at this time.  However, given pt is having not infrequent flares of FMF with risk of more severe complications as amyloidosis, patient was restarted on colchicine every other day (due to Hx of rhabdomyolysis on ?BID dosing of colchicine after being on the medication for many months). So far pt has not had any muscle weakness/pain, or elevated CPKs. Pt has not had any major FMF flares since 12/2021.  Workup by GI showed gastric ulcer/erosions and pt is now treated with PPI with resolution of her GI symptoms. Pt now believes her GI symptoms in the past was not related to Otezla. Pt has occasional psoriasis flares with viral infections and has heel spurs, plantar fasciitis adequately managed with orthotics. At this time, pt is staying off Otezla.   - Pt had labwork by PCP in 7/2023. Advised to try to add CPK to next regular labs.  - c/w colchicine 0.6mg every other day. Patient to stop the medication for any signs of muscle pain/weakness and I will obtain CPK level  - Continue off any chronic medication for PsA. If symptoms of PsA worsening, will consider restarting Otezla  - Pt can call for significant fever if steroid burst is needed - RTC 6 months or earlier if needed for follow up.

## 2024-04-02 NOTE — HISTORY OF PRESENT ILLNESS
[FreeTextEntry1] : HISTORY:  32 y/o female presents as follow up for treatment of PsA and FMF.  Psoriatic arthritis was diagnosed ~2020 by Dr. Eitan Kumari in setting of inflammatory joint pains and psoriasis. Denies uveitis, dactylitis. Pt was on Otezla but stopped when she became pregnant. Pt did not have side effects with Otezla with improvement of her psoriasis and joint pains.  Pt has now given birth and now has flares of joint pains (hands, knees, hips, ankles) and psoriasis. Pt does not plan to have children in the future. Pt has stopped breastfeeding.  Pt had Hx of FMF and treated with colchicine but stopped after rhabdomyolysis. Pt would have recurrent high fever, joint pains, abdominal pain every ~3 months. Pt has not had these symptoms since pregnancy.  Mother and Father diagnosed with RA. Patient and father have FMF.   Workup by me with minimal positive JONO with negative JONO subserologies. Mildly elevated inflammatory markers. XR without inflammatory/erosive changes.  Patient clinically has psoriatic arthritis in setting of inflammatory arthritis and psoriasis, which has been in remission during pregnancy, now returning after birth. Pt was restarted on Otezla in 11/2022 but stopped due to GI side effects. Given minimal symptoms of PsA, patient is currently being treated on PRN based with NSAIDs at this time.  However, given pt is having not infrequent flares of FMF with risk of more severe complications as amyloidosis, patient was restarted on colchicine every other day (due to Hx of rhabdomyolysis on ?BID dosing of colchicine after being on the medication for many months). So far pt has not had any muscle weakness/pain, or elevated CPKs. Pt has not had any major FMF flares since 12/2021.  Workup by GI showed gastric ulcer/erosions and pt is now treated with PPI with resolution of her GI symptoms. Pt now believes her GI symptoms in the past was not related to Otezla. Pt has occasional psoriasis flares with viral infections and has heel spurs, plantar fasciitis adequately managed with orthotics. At this time, pt is staying off Otezla.   INTERVAL HISTORY:  Pt here for 6 months follow up. Patient was seen by ortho in 1/2024 for L knee pain. Patient was advised on PT.  Pt had severe episode of fever 103F last month which resolved after 3-4 days. However, otherwise, frequency of fever has continued to be rare (~2x/year). Pt denies any new symptoms or concerns today.  WORKUP: Remarkable for (11/2021 - 3/2022): JONO 1:80 speckled, CRP 8 -> 5, ESR 26 -> 14  Normal/neg (11/2021 - 3/2022): CBC, CMP, U/A, UPCR, dsDNA, SSA, SSB, , RNP, C3, C4, thyroid Ab, SSA, SSB, RF, CCP, CPK, Hep B/C panel, Quantiferon TB  XR b/l hands (12/2021): Normal  XR b/l knees (12/2021): Normal  XR SI joint (12/2021): Normal

## 2024-04-02 NOTE — PHYSICAL EXAM
[TextEntry] : GENERAL: Appears in no acute distress HEENT: EOMI, PERRLA. No conjunctival erythema. Moist mucous membranes. No nasopharyngeal ulcers NECK: Supple, no cervical lymphadenopathy, no thyromegaly CARDIOVASCULAR: RRR. S1, S2 auscultated. No murmurs or rubs. PULMONARY: Clear to auscultation b/l, no wheezes, rales, or crackles ABDOMINAL: Soft, nontender, nondistended. Bowel sounds present. No organomegaly. MSK: No tenderness to palpation. No deformities. No dactylitis, enthesitis. SKIN: No active rashes NEURO: No focal deficits PSYCH: AAOx3. Normal affect and thought process.

## 2024-04-03 NOTE — HISTORY OF PRESENT ILLNESS
[No] : Patient does not have concerns regarding sex [Currently Active] : currently active [Patient refuses STI testing] : Patient refuses STI testing [FreeTextEntry1] : Selma is a  LMP 3/23/24 who presents for annual exam. She is without complaints. Denies pelvic pain, abnormal bleeding, or vaginal discharge. Denies issues with bowel or bladder function.  She is sexually active with male partner (s). She is using condoms for contraception. Denies pain with intercourse. She is sexually satisfied.  Lives with family. Works as RN. Feels safe at home. Denies depression/abuse.  Immunizations: received gardasil Her mother recently underwent surgery for ovarian cancer, per patient she is currently doing well.

## 2024-04-03 NOTE — PHYSICAL EXAM
[Chaperone Present] : A chaperone was present in the examining room during all aspects of the physical examination [Appropriately responsive] : appropriately responsive [Alert] : alert [No Acute Distress] : no acute distress [No Lymphadenopathy] : no lymphadenopathy [Soft] : soft [Non-tender] : non-tender [Non-distended] : non-distended [Oriented x3] : oriented x3 [Examination Of The Breasts] : a normal appearance [No Discharge] : no discharge [No Masses] : no breast masses were palpable [Labia Majora] : normal [Labia Minora] : normal [No Bleeding] : There was no active vaginal bleeding [Normal] : normal [Uterine Adnexae] : non-palpable [FreeTextEntry1] : leonides

## 2024-04-03 NOTE — DISCUSSION/SUMMARY
[FreeTextEntry1] :   The benefits of adequate calcium intake and a daily multivitamin along with routine daily cardiovascular exercise were reviewed with the patient.   We discussed long term OCP use or bilateral salpingectomy for ovarian cancer risk reduction.    The importance of safer-sex was discussed with the patient. We reviewed ASCCP/ACOG guidelines for pap smears.

## 2024-04-05 LAB — HPV HIGH+LOW RISK DNA PNL CVX: NOT DETECTED

## 2024-04-08 ENCOUNTER — APPOINTMENT (OUTPATIENT)
Dept: ORTHOPEDIC SURGERY | Facility: CLINIC | Age: 34
End: 2024-04-08
Payer: COMMERCIAL

## 2024-04-08 DIAGNOSIS — M48.061 SPINAL STENOSIS, LUMBAR REGION WITHOUT NEUROGENIC CLAUDICATION: ICD-10-CM

## 2024-04-08 DIAGNOSIS — M51.37 OTHER INTERVERTEBRAL DISC DEGENERATION, LUMBOSACRAL REGION: ICD-10-CM

## 2024-04-08 DIAGNOSIS — M47.817 SPONDYLOSIS W/OUT MYELOPATHY OR RADICULOPATHY, LUMBOSACRAL REGION: ICD-10-CM

## 2024-04-08 DIAGNOSIS — M54.16 RADICULOPATHY, LUMBAR REGION: ICD-10-CM

## 2024-04-08 DIAGNOSIS — M51.36 OTHER INTERVERTEBRAL DISC DEGENERATION, LUMBAR REGION: ICD-10-CM

## 2024-04-08 PROCEDURE — 72100 X-RAY EXAM L-S SPINE 2/3 VWS: CPT

## 2024-04-08 PROCEDURE — 99214 OFFICE O/P EST MOD 30 MIN: CPT

## 2024-04-08 NOTE — ASSESSMENT
[FreeTextEntry1] : 32 yo female presents today for eval of her low back pain. X-ray shows DDD L5-S1. Patient with improvements in pain. I discussed with patient that an MRI is indicated for further evaluation in conjunction with starting PT to prevent further flare ups of back pain.   - Patient given prescription for MRI, follow up after study is completed to discuss results.   - Recommend physical therapy to regain range of motion, strengthening and symptomatic improvement. Prescription given in office today.   - Recommend NSAIDs PRN - Recommend heating pad use to decrease muscle spasm - Discussed the importance of home exercises, including but not limited to hamstring stretching and core strengthening   Patient was educated on their diagnosis today. All questions answered and patient expressed understanding.  Follow up after MRI

## 2024-04-08 NOTE — HISTORY OF PRESENT ILLNESS
[Lower back] : lower back [Gradual] : gradual [5] : 5 [Dull/Aching] : dull/aching [Tingling] : tingling [Constant] : constant [Sleep] : sleep [Rest] : rest [Sitting] : sitting [Standing] : standing [Walking] : walking [Exercising] : exercising [Full time] : Work status: full time [de-identified] : Patient presents today with lower back pain since 3/2024 with NKI. Patient previously completed PT. Patient went to [facility] ER, was prescribed Oxycodone and Robaxin. Patient states her pain radiates into the buttock, has numbness/tingling. Patient denies taking pain medication.     [] : no [FreeTextEntry7] : into the buttock [de-identified] : Clinical

## 2024-04-08 NOTE — PHYSICAL EXAM
[de-identified] : Constitutional: Well groomed and developed.  Respiratory: Normal, unlabored breathing. No use of accessory muscles.  Skin: No rashes or ulcers. Skin warm and dry.  Psychiatric: Oriented to time, place, person and event. No acute distress. Gait: Heel to toe Patient able to walk on toes and heels.    Lumbar spine:  Posture: Normal on coronal and sagittal alignment  AROM:  Flexion 90 Extension 20 Mild pain with simulated truncal motion   Tenderness:  Thoracic: No tenderness on palpation  Lumbar: No tenderness on palpation  Sacrum/coccyx: no tenderness on palpation  Greater trochanteric bursa:  No tenderness  PSIS: None    Motor:                         R             L LE:                                IS                    5             5 Quad              5             5 TA                  5             5 EHL                5             5 Gastroc         5             5                 R  L DTR: Patella  2+  2+ Achilles  2+  2+  Sensory: Light touch sensation intact T12-S1  Babinski's Sign: Negative bilaterally  Straight leg raise test: Negative bilaterally  EUGENIA test: negative bilaterally

## 2024-04-09 LAB — CYTOLOGY CVX/VAG DOC THIN PREP: NORMAL

## 2024-05-03 ENCOUNTER — APPOINTMENT (OUTPATIENT)
Age: 34
End: 2024-05-03

## 2024-05-03 DIAGNOSIS — T78.40XA ALLERGY, UNSPECIFIED, INITIAL ENCOUNTER: ICD-10-CM

## 2024-05-03 PROCEDURE — 97811 ACUP 1/> W/O ESTIM EA ADD 15: CPT

## 2024-05-03 PROCEDURE — 97810 ACUP 1/> WO ESTIM 1ST 15 MIN: CPT

## 2024-05-04 ENCOUNTER — NON-APPOINTMENT (OUTPATIENT)
Age: 34
End: 2024-05-04

## 2024-05-06 ENCOUNTER — RESULT REVIEW (OUTPATIENT)
Age: 34
End: 2024-05-06

## 2024-05-06 ENCOUNTER — APPOINTMENT (OUTPATIENT)
Dept: MRI IMAGING | Facility: CLINIC | Age: 34
End: 2024-05-06
Payer: COMMERCIAL

## 2024-05-06 ENCOUNTER — TRANSCRIPTION ENCOUNTER (OUTPATIENT)
Age: 34
End: 2024-05-06

## 2024-05-06 PROCEDURE — 72148 MRI LUMBAR SPINE W/O DYE: CPT

## 2024-05-13 ENCOUNTER — APPOINTMENT (OUTPATIENT)
Dept: ORTHOPEDIC SURGERY | Facility: CLINIC | Age: 34
End: 2024-05-13

## 2024-05-15 NOTE — OB RN PATIENT PROFILE - NSPRENATALGBS_OBGYN_ALL_OB_START_DATE
[Left] : left hand [NL (75)] : Dorsiflexion 75 degrees [NL (85)] : volarflexion 85 degrees [FreeTextEntry9] : 3-4mm cyst medial portion of arm [] : no nail deformity 06-Jul-2021

## 2024-06-19 ENCOUNTER — RX RENEWAL (OUTPATIENT)
Age: 34
End: 2024-06-19

## 2024-06-19 RX ORDER — COLCHICINE 0.6 MG/1
0.6 CAPSULE ORAL
Qty: 45 | Refills: 0 | Status: ACTIVE | COMMUNITY
Start: 2022-01-24 | End: 1900-01-01

## 2024-06-20 PROBLEM — T78.40XA ALLERGIES: Status: ACTIVE | Noted: 2024-06-20

## 2024-06-21 NOTE — REASON FOR VISIT
[New Patient] : [unfilled] is a new patient [TextEntry] : 32y/o, female, NPA, presents for an initial acupuncture encounter with a chief complaint of allergies.

## 2024-06-21 NOTE — PROCEDURE
[Time Out Completed] : Patient's name, date of birth, procedure and correct site confirmed [Written consent] : and a written consent was obtained prior to the procedure and is detailed in the patient's record [Attending] : Attending [Alcohol] : alcohol [Supine] : Supine [Other: ___ mins] : [unfilled] mins [FreeTextEntry4] : PT presents with a chief complaint of persistent allergies, onset 10+yrs, affecting her headwith cough, nasal congestion, runny eyes, itchy throat, distupting her sleep when severe, managed with nasal rinse, eye drops, and Rx biomeds with some relief of symptoms if caught early.  [de-identified] : 1. Hot/cold/fever/chills: runs cooler, fingers and toes 2. Sweat: normal on exertion 3. Head/face: prev oily hair managed and controlled with Rx Acutaine now dryer. Psoriasis of the hairline, some hair loss, itchy post ear along GB channel worse in the winter accompanied by itchy hands and inside of the legs  4. Pain (OLDCARTS): PT reports freq neck pain with related HA that she manages with chiro, L5-S1 herniation with some sciatic like presentations,  5. Urine/stool: 1-2d, formed, tends towards looser with increase in anxiety and related N/V, urine output is equal to intake of fluids, some urgency 6. Thirst/appetitie/taste: good sher, craves savory, tends to snack through the day, excellent water intake d/t habitual, prefer room temp 7. Sleep: falls easily, stays sound, wakes rested 8. Thorax/abdomen: history of gastritis/ST ulcer with related increase in anxiety onset 1.5yrs following diff family time loss of both grandparents and sig change in diet.  9. Gynecology: menarche 12y/o, 28-35d/cycle, with increased gas and bloating, 4-5d active with dark turning to bright colored blood, more consistent post partum. , reports first ever anxiety attack after post partum, diff in breast feeding, latching and lack of milk 10. History: Familial Mediterranean Fever, tonsilectomy, uterine polyps, PCOS, psoriatic arthritis  General: Pre-prego freq/easy to catch colds, post partum much more hearty, recent weight gain, freq drop in energy in the afternoon.  Pulse (R): floating, tight, sl rapid Pulse (L): thin, sl rapid Tongue: pale thin body, red tip, very thick sticky white coat iván SP/ST, ext wet edges, distended sublingual veins [FreeTextEntry1] : 19 [FreeTextEntry2] : 19 [FreeTextEntry3] : Entered the room: 9:00, initial intake 45min, palpation and needle insertion 20min. Exited the room: 10:05, needle retention 20min. Entered the room: 10:25, needles removed.  Total time spent in the room with the PT: 70min. PT accepted and tolerated the needles well and was able to retain them for the entire treatment.  This was the PT first acupuncture experience with this provider, and a conservative approach was adopted both in number of needles, location and intensity of manipulation. After follow-up and assessment of their reaction to the treatment a more aggressive approach may be adopted at subsequent encounters.  The provider used single use, disposable, stainless steel, filiform needles that he inserted at critical points on the body determined based on the patient's chief complaint and in accordance with the theories and principles of Traditional Chinese Medicine (TCM) acupuncture practice. The provider located the points according to TCM point location and by palpating for anatomical landmarks. After locating the point, the location is cleaned with an individually packaged, single use, alcohol swab, performed in a single, circular motion on the skin. The provider selected the appropriate needle lengths based on the individual point to be needled, then carefully inserted the needles, and mildly stimulated the needle by various techniques such as rotating the needle or inserting and withdrawing the needle repeatedly depending upon the affect desired, until the sensation of 'de qi' is achieved according to patient report. After all needles have been inserted, repeating the above protocol for each point, he then instructed the patient to rest for a period of time with the needles still inserted. He returned periodically to check on the patient and readjust the needles as necessary. When the provider sees the desired effect is achieved, he removed and disposed of the needles in the sharp's container, applying pressure on the points with a cotton ball to prevent bruising or bleeding. He provided the patient with post procedure instructions, and then charts the procedure. He does not include the patient resting time as part of the time calculation for this service, but only the time he actually spends with the patient.

## 2024-06-29 ENCOUNTER — NON-APPOINTMENT (OUTPATIENT)
Age: 34
End: 2024-06-29

## 2024-07-01 ENCOUNTER — APPOINTMENT (OUTPATIENT)
Age: 34
End: 2024-07-01

## 2024-07-01 DIAGNOSIS — M79.641 PAIN IN RIGHT HAND: ICD-10-CM

## 2024-07-01 DIAGNOSIS — M79.642 PAIN IN RIGHT HAND: ICD-10-CM

## 2024-07-01 PROCEDURE — 97811 ACUP 1/> W/O ESTIM EA ADD 15: CPT

## 2024-07-01 PROCEDURE — 97810 ACUP 1/> WO ESTIM 1ST 15 MIN: CPT

## 2024-07-24 ENCOUNTER — APPOINTMENT (OUTPATIENT)
Age: 34
End: 2024-07-24

## 2024-07-24 DIAGNOSIS — F41.9 ANXIETY DISORDER, UNSPECIFIED: ICD-10-CM

## 2024-07-24 DIAGNOSIS — F43.9 REACTION TO SEVERE STRESS, UNSPECIFIED: ICD-10-CM

## 2024-07-24 PROCEDURE — 97810 ACUP 1/> WO ESTIM 1ST 15 MIN: CPT

## 2024-07-24 PROCEDURE — 97811 ACUP 1/> W/O ESTIM EA ADD 15: CPT

## 2024-07-24 NOTE — REASON FOR VISIT
[Established Patient] : [unfilled] is an established patient [TextEntry] : 34y/o, female, RPA, presents with a recurring chief complaint of elevated stress levels.

## 2024-07-24 NOTE — PROCEDURE
[Time Out Completed] : Patient's name, date of birth, procedure and correct site confirmed [Written consent] : and a written consent was obtained prior to the procedure and is detailed in the patient's record [Attending] : Attending [Other: ___] : [unfilled] [Alcohol] : alcohol [Supine] : Supine [Both] : Laterality: both [No complications] : No complications [FreeTextEntry4] : PT presents with elevated stress levels and associated anxiousness, onset of acute condition is 2wks, PT reports remembering being anxious since childhood, stressors are familial and work related as she is about to begin a new job and wrapping up existing job, with upset stomach, loose stools, feeling of anxiousness. PT is on day 1 of mensturation, brighter colored blood than previously reported, no PMS or cramping. PT attempts to manage with herbal teas, working outdoors.  [FreeTextEntry6] : generalized [de-identified] : Pulse (R): floating, tight, sl rapid Pulse (L): thin, sl rapid Tongue: pale thin body, red tip, very thick sticky white coat iván SP/ST, ext wet edges, distended sublingual veins. [FreeTextEntry3] : Entered the room: 3:00, initial intake 20min, palpation and needle insertion 20min. Exited the room: 3:40, needle retention 20min. Entered the room: 4:00, needles removed.  Total time spent in the room with the PT: 40min. PT accepted and tolerated the needles well and was able to retain them for the entire treatment.  The provider used single use, disposable, stainless steel, filiform needles that he inserted at critical points on the body determined based on the patient's chief complaint and in accordance with the theories and principles of Traditional Chinese Medicine (TCM) acupuncture practice. The provider located the points according to TCM point location and by palpating for anatomical landmarks. After locating the point, the location is cleaned with an individually packaged, single use, alcohol swab, performed in a single, circular motion on the skin. The provider selected the appropriate needle lengths based on the individual point to be needled, then carefully inserted the needles, and mildly stimulated the needle by various techniques such as rotating the needle or inserting and withdrawing the needle repeatedly depending upon the affect desired, until the sensation of 'de qi' is achieved according to patient report. After all needles have been inserted, repeating the above protocol for each point, he then instructed the patient to rest for a period of time with the needles still inserted. He returned periodically to check on the patient and readjust the needles as necessary. When the provider sees the desired effect is achieved, he removed and disposed of the needles in the sharp's container, applying pressure on the points with a cotton ball to prevent bruising or bleeding. He provided the patient with post procedure instructions, and then charts the procedure. He does not include the patient resting time as part of the time calculation for this service, but only the time he actually spends with the patient.

## 2024-07-24 NOTE — ASSESSMENT
[TextEntry] : This is LV qi stasis with underlying SP qi vacuity and damp heat accumulation, with heat in the middle razia rising to harass the HT.

## 2024-07-24 NOTE — PLAN
[FreeTextEntry1] : Treatment Principles: Smooth LV qi, tonify SP qi, drain damp heat from middle razia, benefit HT.

## 2024-09-05 ENCOUNTER — NON-APPOINTMENT (OUTPATIENT)
Age: 34
End: 2024-09-05

## 2024-09-13 ENCOUNTER — OFFICE (OUTPATIENT)
Dept: URBAN - METROPOLITAN AREA CLINIC 38 | Facility: CLINIC | Age: 34
Setting detail: OPHTHALMOLOGY
End: 2024-09-13
Payer: COMMERCIAL

## 2024-09-13 ENCOUNTER — RX ONLY (RX ONLY)
Age: 34
End: 2024-09-13

## 2024-09-13 DIAGNOSIS — H40.013: ICD-10-CM

## 2024-09-13 DIAGNOSIS — H25.13: ICD-10-CM

## 2024-09-13 DIAGNOSIS — H10.433: ICD-10-CM

## 2024-09-13 DIAGNOSIS — H01.001: ICD-10-CM

## 2024-09-13 PROBLEM — H16.223 DRY EYE SYNDROME K SICCA; BOTH EYES: Status: ACTIVE | Noted: 2024-09-13

## 2024-09-13 PROBLEM — H01.004 BLEPHARITIS; RIGHT UPPER LID, LEFT UPPER LID: Status: ACTIVE | Noted: 2024-09-13

## 2024-09-13 PROCEDURE — 92014 COMPRE OPH EXAM EST PT 1/>: CPT | Performed by: OPHTHALMOLOGY

## 2024-09-13 PROCEDURE — 92133 CPTRZD OPH DX IMG PST SGM ON: CPT | Performed by: OPHTHALMOLOGY

## 2024-09-13 PROCEDURE — 92250 FUNDUS PHOTOGRAPHY W/I&R: CPT | Performed by: OPHTHALMOLOGY

## 2024-09-13 ASSESSMENT — LID EXAM ASSESSMENTS
OS_BLEPHARITIS: LUL T
OD_BLEPHARITIS: RUL T

## 2024-09-13 ASSESSMENT — CONFRONTATIONAL VISUAL FIELD TEST (CVF)
OD_FINDINGS: FULL
OS_FINDINGS: FULL

## 2024-10-02 ENCOUNTER — APPOINTMENT (OUTPATIENT)
Dept: RHEUMATOLOGY | Facility: CLINIC | Age: 34
End: 2024-10-02
Payer: COMMERCIAL

## 2024-10-02 VITALS — HEART RATE: 62 BPM | DIASTOLIC BLOOD PRESSURE: 75 MMHG | OXYGEN SATURATION: 99 % | SYSTOLIC BLOOD PRESSURE: 113 MMHG

## 2024-10-02 DIAGNOSIS — M04.1 PERIODIC FEVER SYNDROMES: ICD-10-CM

## 2024-10-02 DIAGNOSIS — L40.9 PSORIASIS, UNSPECIFIED: ICD-10-CM

## 2024-10-02 DIAGNOSIS — L40.50 ARTHROPATHIC PSORIASIS, UNSPECIFIED: ICD-10-CM

## 2024-10-02 DIAGNOSIS — Z79.899 OTHER LONG TERM (CURRENT) DRUG THERAPY: ICD-10-CM

## 2024-10-02 PROCEDURE — 99214 OFFICE O/P EST MOD 30 MIN: CPT

## 2024-10-02 PROCEDURE — G2211 COMPLEX E/M VISIT ADD ON: CPT

## 2024-10-02 NOTE — ASSESSMENT
[FreeTextEntry1] : 35 y/o female presents as follow up for treatment of PsA and FMF.  Psoriatic arthritis was diagnosed ~2020 by Dr. Eitan Kumari in setting of inflammatory joint pains and psoriasis. Denies uveitis, dactylitis. Pt was on Otezla but stopped when she became pregnant. Pt did not have side effects with Otezla with improvement of her psoriasis and joint pains.  Pt has now given birth and now has flares of joint pains (hands, knees, hips, ankles) and psoriasis. Pt does not plan to have children in the future. Pt has stopped breastfeeding.  Pt had Hx of FMF and treated with colchicine but stopped after rhabdomyolysis. Pt would have recurrent high fever, joint pains, abdominal pain every ~3 months. Pt has not had these symptoms since pregnancy.  Mother and Father diagnosed with RA. Patient and father have FMF.   Workup by me with minimal positive JONO with negative JONO subserologies. Mildly elevated inflammatory markers. XR without inflammatory/erosive changes.  Patient clinically has psoriatic arthritis in setting of inflammatory arthritis and psoriasis, which has been in remission during pregnancy, now returning after birth. Pt was restarted on Otezla in 11/2022 but stopped due to GI side effects. Given minimal symptoms of PsA, patient is currently being treated on PRN based with NSAIDs at this time.  However, given pt is having not infrequent flares of FMF with risk of more severe complications as amyloidosis, patient was restarted on colchicine every other day (due to Hx of rhabdomyolysis on ?BID dosing of colchicine after being on the medication for many months). So far pt has not had any muscle weakness/pain, or elevated CPKs. Pt has not had any major FMF flares since 12/2021.  Workup by GI showed gastric ulcer/erosions and pt is now treated with PPI with resolution of her GI symptoms. Pt now believes her GI symptoms in the past was not related to Otezla. Pt has occasional psoriasis flares with viral infections and has heel spurs, plantar fasciitis adequately managed with orthotics. At this time, pt is staying off Otezla.    Pt had flare of FMF 3 months ago with high fever which improved with colchicine. Since last visit, patient was seen by ortho for low back pain with radiation to buttocks. MR L-spine showed DDD at L5-S1 with L S1 descending nerve root compression. Patient underwent PT, acupuncture.  - Pt had labwork by PCP in 7/2024, no need for labwork today. - c/w colchicine 0.6mg every other day. Patient to stop the medication for any signs of muscle pain/weakness and I will obtain CPK level  - Continue off any chronic medication for PsA. If symptoms of PsA worsening, will consider restarting Otezla  - Pt can call for significant fever if steroid burst is needed  - RTC 6 months or earlier if needed for follow up.

## 2024-10-02 NOTE — HISTORY OF PRESENT ILLNESS
[FreeTextEntry1] : HISTORY:  33 y/o female presents as follow up for treatment of PsA and FMF.  Psoriatic arthritis was diagnosed ~2020 by Dr. Eitan Kumari in setting of inflammatory joint pains and psoriasis. Denies uveitis, dactylitis. Pt was on Otezla but stopped when she became pregnant. Pt did not have side effects with Otezla with improvement of her psoriasis and joint pains.  Pt has now given birth and now has flares of joint pains (hands, knees, hips, ankles) and psoriasis. Pt does not plan to have children in the future. Pt has stopped breastfeeding.  Pt had Hx of FMF and treated with colchicine but stopped after rhabdomyolysis. Pt would have recurrent high fever, joint pains, abdominal pain every ~3 months. Pt has not had these symptoms since pregnancy.  Mother and Father diagnosed with RA. Patient and father have FMF.   Workup by me with minimal positive JONO with negative JONO subserologies. Mildly elevated inflammatory markers. XR without inflammatory/erosive changes.  Patient clinically has psoriatic arthritis in setting of inflammatory arthritis and psoriasis, which has been in remission during pregnancy, now returning after birth. Pt was restarted on Otezla in 11/2022 but stopped due to GI side effects. Given minimal symptoms of PsA, patient is currently being treated on PRN based with NSAIDs at this time.  However, given pt is having not infrequent flares of FMF with risk of more severe complications as amyloidosis, patient was restarted on colchicine every other day (due to Hx of rhabdomyolysis on ?BID dosing of colchicine after being on the medication for many months). So far pt has not had any muscle weakness/pain, or elevated CPKs. Pt has not had any major FMF flares since 12/2021.  Workup by GI showed gastric ulcer/erosions and pt is now treated with PPI with resolution of her GI symptoms. Pt now believes her GI symptoms in the past was not related to Otezla. Pt has occasional psoriasis flares with viral infections and has heel spurs, plantar fasciitis adequately managed with orthotics. At this time, pt is staying off Otezla.    INTERVAL HISTORY:  Pt here for 6 months follow up. Pt had flare of FMF 3 months ago with high fever which improved with colchicine. Since last visit, patient was seen by ortho for low back pain with radiation to buttocks. MR L-spine showed DDD at L5-S1 with L S1 descending nerve root compression. Patient underwent PT, acupuncture. Pt had labwork 1 month ago ([facility]) with normal CBC, CMP, CPK.   WORKUP:  Remarkable for (11/2021 - 3/2022): JONO 1:80 speckled, CRP 8 -> 5, ESR 26 -> 14  Normal/neg (11/2021 - 3/2022): CBC, CMP, U/A, UPCR, dsDNA, SSA, SSB, , RNP, C3, C4, thyroid Ab, SSA, SSB, RF, CCP, CPK, Hep B/C panel, Quantiferon TB  XR b/l hands (12/2021): Normal  XR b/l knees (12/2021): Normal  XR SI joint (12/2021): Normal  MR L-spine (4/2024): paracentral disc protrusion at L5-S1 with L S1 descending nerve root compression.

## 2024-10-23 ENCOUNTER — NON-APPOINTMENT (OUTPATIENT)
Age: 34
End: 2024-10-23

## 2024-11-04 ENCOUNTER — TRANSCRIPTION ENCOUNTER (OUTPATIENT)
Age: 34
End: 2024-11-04

## 2024-11-26 ENCOUNTER — ASOB RESULT (OUTPATIENT)
Age: 34
End: 2024-11-26

## 2024-11-26 ENCOUNTER — APPOINTMENT (OUTPATIENT)
Dept: ANTEPARTUM | Facility: CLINIC | Age: 34
End: 2024-11-26
Payer: COMMERCIAL

## 2024-11-26 ENCOUNTER — APPOINTMENT (OUTPATIENT)
Dept: OBGYN | Facility: CLINIC | Age: 34
End: 2024-11-26
Payer: COMMERCIAL

## 2024-11-26 VITALS
WEIGHT: 226 LBS | BODY MASS INDEX: 35.47 KG/M2 | DIASTOLIC BLOOD PRESSURE: 70 MMHG | SYSTOLIC BLOOD PRESSURE: 112 MMHG | HEIGHT: 67 IN

## 2024-11-26 DIAGNOSIS — Z11.3 ENCOUNTER FOR SCREENING FOR INFECTIONS WITH A PREDOMINANTLY SEXUAL MODE OF TRANSMISSION: ICD-10-CM

## 2024-11-26 DIAGNOSIS — N76.0 ACUTE VAGINITIS: ICD-10-CM

## 2024-11-26 DIAGNOSIS — N93.0 POSTCOITAL AND CONTACT BLEEDING: ICD-10-CM

## 2024-11-26 DIAGNOSIS — N94.0 MITTELSCHMERZ: ICD-10-CM

## 2024-11-26 PROCEDURE — 99459 PELVIC EXAMINATION: CPT

## 2024-11-26 PROCEDURE — 99204 OFFICE O/P NEW MOD 45 MIN: CPT

## 2024-11-26 PROCEDURE — 76857 US EXAM PELVIC LIMITED: CPT | Mod: 59

## 2024-11-26 PROCEDURE — 81025 URINE PREGNANCY TEST: CPT

## 2024-11-26 PROCEDURE — 76830 TRANSVAGINAL US NON-OB: CPT

## 2024-12-01 LAB
HCG UR QL: NEGATIVE
QUALITY CONTROL: YES

## 2024-12-10 ENCOUNTER — APPOINTMENT (OUTPATIENT)
Dept: ELECTROPHYSIOLOGY | Facility: CLINIC | Age: 34
End: 2024-12-10
Payer: COMMERCIAL

## 2024-12-10 ENCOUNTER — OUTPATIENT (OUTPATIENT)
Dept: OUTPATIENT SERVICES | Facility: HOSPITAL | Age: 34
LOS: 1 days | End: 2024-12-10
Payer: COMMERCIAL

## 2024-12-10 ENCOUNTER — RESULT REVIEW (OUTPATIENT)
Age: 34
End: 2024-12-10

## 2024-12-10 ENCOUNTER — NON-APPOINTMENT (OUTPATIENT)
Age: 34
End: 2024-12-10

## 2024-12-10 ENCOUNTER — APPOINTMENT (OUTPATIENT)
Dept: CV DIAGNOSITCS | Facility: HOSPITAL | Age: 34
End: 2024-12-10

## 2024-12-10 VITALS
SYSTOLIC BLOOD PRESSURE: 109 MMHG | OXYGEN SATURATION: 98 % | HEIGHT: 67 IN | DIASTOLIC BLOOD PRESSURE: 56 MMHG | TEMPERATURE: 99 F | HEART RATE: 97 BPM | WEIGHT: 226 LBS | BODY MASS INDEX: 35.47 KG/M2

## 2024-12-10 DIAGNOSIS — R00.2 PALPITATIONS: ICD-10-CM

## 2024-12-10 DIAGNOSIS — Z79.899 OTHER LONG TERM (CURRENT) DRUG THERAPY: ICD-10-CM

## 2024-12-10 DIAGNOSIS — M04.1 PERIODIC FEVER SYNDROMES: ICD-10-CM

## 2024-12-10 DIAGNOSIS — L40.50 ARTHROPATHIC PSORIASIS, UNSPECIFIED: ICD-10-CM

## 2024-12-10 PROCEDURE — 99203 OFFICE O/P NEW LOW 30 MIN: CPT

## 2024-12-10 PROCEDURE — 76376 3D RENDER W/INTRP POSTPROCES: CPT | Mod: 26

## 2024-12-10 PROCEDURE — 93306 TTE W/DOPPLER COMPLETE: CPT | Mod: 26

## 2024-12-10 PROCEDURE — 93000 ELECTROCARDIOGRAM COMPLETE: CPT

## 2024-12-10 PROCEDURE — 93356 MYOCRD STRAIN IMG SPCKL TRCK: CPT

## 2024-12-12 LAB
ALBUMIN SERPL ELPH-MCNC: 4.2 G/DL
ALP BLD-CCNC: 68 U/L
ALT SERPL-CCNC: 16 U/L
ANION GAP SERPL CALC-SCNC: 14 MMOL/L
AST SERPL-CCNC: 17 U/L
BILIRUB SERPL-MCNC: 0.5 MG/DL
BUN SERPL-MCNC: 12 MG/DL
CALCIUM SERPL-MCNC: 9.7 MG/DL
CHLORIDE SERPL-SCNC: 106 MMOL/L
CK SERPL-CCNC: 87 U/L
CO2 SERPL-SCNC: 22 MMOL/L
CREAT SERPL-MCNC: 0.54 MG/DL
CRP SERPL-MCNC: 3 MG/L
EGFR: 124 ML/MIN/1.73M2
ERYTHROCYTE [SEDIMENTATION RATE] IN BLOOD BY WESTERGREN METHOD: 11 MM/HR
GLUCOSE SERPL-MCNC: 104 MG/DL
HCT VFR BLD CALC: 38.8 %
HGB BLD-MCNC: 12 G/DL
MCHC RBC-ENTMCNC: 29.3 PG
MCHC RBC-ENTMCNC: 30.9 G/DL
MCV RBC AUTO: 94.6 FL
PLATELET # BLD AUTO: 294 K/UL
POTASSIUM SERPL-SCNC: 4.4 MMOL/L
PROT SERPL-MCNC: 6.8 G/DL
RBC # BLD: 4.1 M/UL
RBC # FLD: 12.6 %
SODIUM SERPL-SCNC: 142 MMOL/L
WBC # FLD AUTO: 5.86 K/UL

## 2024-12-13 LAB — ALDOLASE SERPL-CCNC: 5.2 U/L

## 2024-12-14 ENCOUNTER — TRANSCRIPTION ENCOUNTER (OUTPATIENT)
Age: 34
End: 2024-12-14

## 2024-12-18 ENCOUNTER — APPOINTMENT (OUTPATIENT)
Dept: RADIOLOGY | Facility: CLINIC | Age: 34
End: 2024-12-18
Payer: COMMERCIAL

## 2024-12-18 ENCOUNTER — APPOINTMENT (OUTPATIENT)
Dept: ULTRASOUND IMAGING | Facility: CLINIC | Age: 34
End: 2024-12-18
Payer: COMMERCIAL

## 2024-12-18 PROCEDURE — 71047 X-RAY EXAM CHEST 3 VIEWS: CPT

## 2024-12-18 PROCEDURE — 76536 US EXAM OF HEAD AND NECK: CPT

## 2024-12-24 ENCOUNTER — TRANSCRIPTION ENCOUNTER (OUTPATIENT)
Age: 34
End: 2024-12-24

## 2024-12-24 RX ORDER — COLCHICINE 0.6 MG/1
0.6 TABLET ORAL
Qty: 45 | Refills: 1 | Status: ACTIVE | COMMUNITY
Start: 2024-12-24 | End: 1900-01-01

## 2024-12-30 ENCOUNTER — NON-APPOINTMENT (OUTPATIENT)
Age: 34
End: 2024-12-30

## 2025-01-06 ENCOUNTER — APPOINTMENT (OUTPATIENT)
Dept: ENDOCRINOLOGY | Facility: CLINIC | Age: 35
End: 2025-01-06
Payer: COMMERCIAL

## 2025-01-06 VITALS
HEART RATE: 67 BPM | HEIGHT: 67 IN | SYSTOLIC BLOOD PRESSURE: 120 MMHG | BODY MASS INDEX: 34.84 KG/M2 | DIASTOLIC BLOOD PRESSURE: 70 MMHG | WEIGHT: 222 LBS | OXYGEN SATURATION: 98 %

## 2025-01-06 DIAGNOSIS — E01.0 IODINE-DEFICIENCY RELATED DIFFUSE (ENDEMIC) GOITER: ICD-10-CM

## 2025-01-06 DIAGNOSIS — E05.90 THYROTOXICOSIS, UNSPECIFIED W/OUT THYROTOXIC CRISIS OR STORM: ICD-10-CM

## 2025-01-06 PROCEDURE — 99244 OFF/OP CNSLTJ NEW/EST MOD 40: CPT

## 2025-01-07 ENCOUNTER — APPOINTMENT (OUTPATIENT)
Dept: NUCLEAR MEDICINE | Facility: HOSPITAL | Age: 35
End: 2025-01-07

## 2025-01-07 LAB
T3 SERPL-MCNC: 276 NG/DL
T4 FREE SERPL-MCNC: 3.4 NG/DL
TSH RECEPTOR AB: 9.17 IU/L
TSH SERPL-ACNC: <0.01 UIU/ML

## 2025-01-07 RX ORDER — METHIMAZOLE 10 MG/1
10 TABLET ORAL
Qty: 180 | Refills: 1 | Status: ACTIVE | COMMUNITY
Start: 2025-01-07 | End: 1900-01-01

## 2025-01-08 ENCOUNTER — APPOINTMENT (OUTPATIENT)
Dept: NUCLEAR MEDICINE | Facility: HOSPITAL | Age: 35
End: 2025-01-08

## 2025-01-08 LAB — TSI ACT/NOR SER: 3.81 IU/L

## 2025-01-13 ENCOUNTER — TRANSCRIPTION ENCOUNTER (OUTPATIENT)
Age: 35
End: 2025-01-13

## 2025-01-21 ENCOUNTER — TRANSCRIPTION ENCOUNTER (OUTPATIENT)
Age: 35
End: 2025-01-21

## 2025-01-27 ENCOUNTER — LABORATORY RESULT (OUTPATIENT)
Age: 35
End: 2025-01-27

## 2025-01-27 ENCOUNTER — TRANSCRIPTION ENCOUNTER (OUTPATIENT)
Age: 35
End: 2025-01-27

## 2025-01-27 RX ORDER — PREDNISONE 20 MG/1
20 TABLET ORAL DAILY
Qty: 5 | Refills: 0 | Status: ACTIVE | COMMUNITY
Start: 2025-01-27 | End: 1900-01-01

## 2025-02-06 ENCOUNTER — APPOINTMENT (OUTPATIENT)
Age: 35
End: 2025-02-06

## 2025-02-19 ENCOUNTER — TRANSCRIPTION ENCOUNTER (OUTPATIENT)
Age: 35
End: 2025-02-19

## 2025-03-06 ENCOUNTER — NON-APPOINTMENT (OUTPATIENT)
Age: 35
End: 2025-03-06

## 2025-04-02 ENCOUNTER — APPOINTMENT (OUTPATIENT)
Dept: RHEUMATOLOGY | Facility: CLINIC | Age: 35
End: 2025-04-02

## 2025-04-07 ENCOUNTER — APPOINTMENT (OUTPATIENT)
Dept: ENDOCRINOLOGY | Facility: CLINIC | Age: 35
End: 2025-04-07
Payer: COMMERCIAL

## 2025-04-07 DIAGNOSIS — E05.00 THYROTOXICOSIS WITH DIFFUSE GOITER W/OUT THYROTOXIC CRISIS OR STORM: ICD-10-CM

## 2025-04-07 DIAGNOSIS — E01.0 IODINE-DEFICIENCY RELATED DIFFUSE (ENDEMIC) GOITER: ICD-10-CM

## 2025-04-07 DIAGNOSIS — E05.90 THYROTOXICOSIS, UNSPECIFIED W/OUT THYROTOXIC CRISIS OR STORM: ICD-10-CM

## 2025-04-07 PROCEDURE — G2211 COMPLEX E/M VISIT ADD ON: CPT | Mod: 95

## 2025-04-07 PROCEDURE — 99214 OFFICE O/P EST MOD 30 MIN: CPT | Mod: 95

## 2025-05-06 ENCOUNTER — APPOINTMENT (OUTPATIENT)
Dept: OBGYN | Facility: CLINIC | Age: 35
End: 2025-05-06
Payer: COMMERCIAL

## 2025-05-06 VITALS
DIASTOLIC BLOOD PRESSURE: 68 MMHG | HEIGHT: 67 IN | BODY MASS INDEX: 35.94 KG/M2 | SYSTOLIC BLOOD PRESSURE: 110 MMHG | WEIGHT: 229 LBS

## 2025-05-06 DIAGNOSIS — Z01.419 ENCOUNTER FOR GYNECOLOGICAL EXAMINATION (GENERAL) (ROUTINE) W/OUT ABNORMAL FINDINGS: ICD-10-CM

## 2025-05-06 PROCEDURE — 99459 PELVIC EXAMINATION: CPT

## 2025-05-06 PROCEDURE — 99395 PREV VISIT EST AGE 18-39: CPT

## 2025-07-17 ENCOUNTER — NON-APPOINTMENT (OUTPATIENT)
Age: 35
End: 2025-07-17

## 2025-08-22 ENCOUNTER — APPOINTMENT (OUTPATIENT)
Dept: ENDOCRINOLOGY | Facility: CLINIC | Age: 35
End: 2025-08-22
Payer: COMMERCIAL

## 2025-08-22 DIAGNOSIS — E66.9 OBESITY, UNSPECIFIED: ICD-10-CM

## 2025-08-22 DIAGNOSIS — E01.0 IODINE-DEFICIENCY RELATED DIFFUSE (ENDEMIC) GOITER: ICD-10-CM

## 2025-08-22 DIAGNOSIS — E05.00 THYROTOXICOSIS WITH DIFFUSE GOITER W/OUT THYROTOXIC CRISIS OR STORM: ICD-10-CM

## 2025-08-22 DIAGNOSIS — E05.90 THYROTOXICOSIS, UNSPECIFIED W/OUT THYROTOXIC CRISIS OR STORM: ICD-10-CM

## 2025-08-22 PROCEDURE — 99214 OFFICE O/P EST MOD 30 MIN: CPT | Mod: 95

## 2025-08-22 PROCEDURE — G2211 COMPLEX E/M VISIT ADD ON: CPT | Mod: 95

## 2025-08-25 RX ORDER — TIRZEPATIDE 2.5 MG/.5ML
2.5 INJECTION, SOLUTION SUBCUTANEOUS
Qty: 1 | Refills: 1 | Status: ACTIVE | COMMUNITY
Start: 2025-08-22

## 2025-08-26 ENCOUNTER — NON-APPOINTMENT (OUTPATIENT)
Age: 35
End: 2025-08-26

## 2025-08-26 ENCOUNTER — OUTPATIENT (OUTPATIENT)
Dept: OUTPATIENT SERVICES | Facility: HOSPITAL | Age: 35
LOS: 1 days | End: 2025-08-26

## 2025-08-26 ENCOUNTER — TRANSCRIPTION ENCOUNTER (OUTPATIENT)
Age: 35
End: 2025-08-26

## 2025-08-26 ENCOUNTER — APPOINTMENT (OUTPATIENT)
Dept: INTERNAL MEDICINE | Facility: CLINIC | Age: 35
End: 2025-08-26

## 2025-09-02 ENCOUNTER — TRANSCRIPTION ENCOUNTER (OUTPATIENT)
Age: 35
End: 2025-09-02